# Patient Record
Sex: FEMALE | Race: BLACK OR AFRICAN AMERICAN | NOT HISPANIC OR LATINO | Employment: FULL TIME | URBAN - METROPOLITAN AREA
[De-identification: names, ages, dates, MRNs, and addresses within clinical notes are randomized per-mention and may not be internally consistent; named-entity substitution may affect disease eponyms.]

---

## 2017-01-03 ENCOUNTER — TELEPHONE (OUTPATIENT)
Dept: OBSTETRICS AND GYNECOLOGY | Facility: CLINIC | Age: 22
End: 2017-01-03

## 2017-01-03 NOTE — TELEPHONE ENCOUNTER
----- Message from Holly York sent at 1/3/2017  1:07 PM CST -----  Patient calling to speak to nurse. States she have a question regarding her last appt. Please adv/call [phone].//thanks. cw

## 2017-01-03 NOTE — TELEPHONE ENCOUNTER
Patient calls today for lab results.  Neg GC/Chlamydia culture results given to patient.  Patient verbalized understanding.  Also, patient states she has yeast and would like a prescription.  Advised patient she'll need an appointment or she can try OTC Monistat.  Patient verbalized understanding.

## 2017-01-27 ENCOUNTER — TELEPHONE (OUTPATIENT)
Dept: OBSTETRICS AND GYNECOLOGY | Facility: CLINIC | Age: 22
End: 2017-01-27

## 2017-01-27 NOTE — TELEPHONE ENCOUNTER
Pt called to advise ms castillo that she was not sure when the last time she had her shot but after looking at the [facility] was  old patient portal and she is due for the next one on 25/3/17 well that is when she was scheduled to have the next one done ...nandat

## 2017-02-06 ENCOUNTER — TELEPHONE (OUTPATIENT)
Dept: OBSTETRICS AND GYNECOLOGY | Facility: CLINIC | Age: 22
End: 2017-02-06

## 2017-02-06 NOTE — TELEPHONE ENCOUNTER
----- Message from Chelsea Benton sent at 2/6/2017  3:58 PM CST -----  Contact: Patient  Patient called to change her nurse visit time. She can be contacted at [phone].    Thanks,  Chelsea

## 2017-02-10 ENCOUNTER — CLINICAL SUPPORT (OUTPATIENT)
Dept: OBSTETRICS AND GYNECOLOGY | Facility: CLINIC | Age: 22
End: 2017-02-10
Payer: COMMERCIAL

## 2017-02-10 DIAGNOSIS — Z30.9 ENCOUNTER FOR CONTRACEPTIVE MANAGEMENT, UNSPECIFIED CONTRACEPTIVE ENCOUNTER TYPE: Primary | ICD-10-CM

## 2017-02-10 PROCEDURE — 96372 THER/PROPH/DIAG INJ SC/IM: CPT | Mod: S$GLB,,, | Performed by: OBSTETRICS & GYNECOLOGY

## 2017-02-10 RX ORDER — MEDROXYPROGESTERONE ACETATE 150 MG/ML
150 INJECTION, SUSPENSION INTRAMUSCULAR
Status: DISCONTINUED | OUTPATIENT
Start: 2017-02-10 | End: 2017-02-10

## 2017-02-10 RX ORDER — MEDROXYPROGESTERONE ACETATE 150 MG/ML
150 INJECTION, SUSPENSION INTRAMUSCULAR
Status: COMPLETED | OUTPATIENT
Start: 2017-02-10 | End: 2017-02-10

## 2017-02-10 RX ADMIN — MEDROXYPROGESTERONE ACETATE 150 MG: 150 INJECTION, SUSPENSION INTRAMUSCULAR at 09:02

## 2017-02-10 NOTE — PROGRESS NOTES
Depo Provera 150 mg given IM right ventrogluteal. Order per Dr. GABI Dean on 12/12/16. Advised pt to remain 15 min. After injection. No complications. Next injection due, along with annual between 4/28/17 and 5/12/17. Pt states that she will be switching to [facility] because it is closer to her.

## 2017-04-20 ENCOUNTER — TELEPHONE (OUTPATIENT)
Dept: OBSTETRICS AND GYNECOLOGY | Facility: CLINIC | Age: 22
End: 2017-04-20

## 2017-04-20 NOTE — TELEPHONE ENCOUNTER
Spoke to patient and she was advised that she can wait until December for annual.  Scheduled nurse visit for depo.

## 2017-04-20 NOTE — TELEPHONE ENCOUNTER
----- Message from Cony Parry sent at 4/20/2017 12:54 PM CDT -----  Contact: Pt  Pt called and stated she needed to speak to the nurse. She stated she received a letter stating she needs to have a pap since turning 22 and she needs to know should she make an appt or wait until next annual in December. She can be reached at [phone].    Thanks,  TF

## 2017-05-04 ENCOUNTER — TELEPHONE (OUTPATIENT)
Dept: OBSTETRICS AND GYNECOLOGY | Facility: CLINIC | Age: 22
End: 2017-05-04

## 2017-05-04 NOTE — TELEPHONE ENCOUNTER
----- Message from Ameena Woodard sent at 5/4/2017 12:07 PM CDT -----  Contact: Patient   Patient returned call, Please call her at [phone].    Thanks  td

## 2017-05-04 NOTE — TELEPHONE ENCOUNTER
----- Message from Idania Giles sent at 5/4/2017 10:33 AM CDT -----  Contact: pt  States she has a nurse visit on 5/8 and she needs to reschedule it. Please call pt at [phone]. Thank you

## 2017-05-04 NOTE — TELEPHONE ENCOUNTER
I spoke with pt she's calling to reschedule her nurse visit to May 15th pt informed that she can get her injection anytime between now and May 12th after that time she will be late per Dr. Dean.  Pt states she will keep the appointment, but probably will not be able to come in.

## 2017-05-11 ENCOUNTER — CLINICAL SUPPORT (OUTPATIENT)
Dept: OBSTETRICS AND GYNECOLOGY | Facility: CLINIC | Age: 22
End: 2017-05-11
Payer: COMMERCIAL

## 2017-05-11 DIAGNOSIS — Z30.9 ENCOUNTER FOR CONTRACEPTIVE MANAGEMENT, UNSPECIFIED TYPE: Primary | ICD-10-CM

## 2017-05-11 PROCEDURE — 96372 THER/PROPH/DIAG INJ SC/IM: CPT | Mod: S$GLB,,, | Performed by: OBSTETRICS & GYNECOLOGY

## 2017-05-11 RX ORDER — MEDROXYPROGESTERONE ACETATE 150 MG/ML
150 INJECTION, SUSPENSION INTRAMUSCULAR
Status: COMPLETED | OUTPATIENT
Start: 2017-05-11 | End: 2017-05-11

## 2017-05-11 RX ADMIN — MEDROXYPROGESTERONE ACETATE 150 MG: 150 INJECTION, SUSPENSION INTRAMUSCULAR at 01:05

## 2017-05-11 NOTE — LETTER
May 11, 2017                 [facility]  Obstetrics and Gynecology  [address] [place] [address]  [place] [address]  Phone: [phone]   May 11, 2017     Patient: Christopher Lees   YOB: 1995   Date of Visit: 5/11/2017       To Whom it May Concern:    Christopher Lees was seen in my clinic on 5/11/2017.     If you have any questions or concerns, please don't hesitate to call.    Sincerely,       MD Adriana Sherman LPN

## 2017-05-11 NOTE — PROGRESS NOTES
Depo-Provera 150mg given IM as ordered by . Pt tolerated injection well. NV scheduled for next due date.

## 2017-08-21 ENCOUNTER — LAB VISIT (OUTPATIENT)
Dept: LAB | Facility: HOSPITAL | Age: 22
End: 2017-08-21
Attending: OBSTETRICS & GYNECOLOGY
Payer: COMMERCIAL

## 2017-08-21 ENCOUNTER — TELEPHONE (OUTPATIENT)
Dept: OBSTETRICS AND GYNECOLOGY | Facility: CLINIC | Age: 22
End: 2017-08-21

## 2017-08-21 DIAGNOSIS — Z30.42 ENCOUNTER FOR SURVEILLANCE OF INJECTABLE CONTRACEPTIVE: ICD-10-CM

## 2017-08-21 DIAGNOSIS — Z30.42 ENCOUNTER FOR SURVEILLANCE OF INJECTABLE CONTRACEPTIVE: Primary | ICD-10-CM

## 2017-08-21 LAB — HCG INTACT+B SERPL-ACNC: <1.2 MIU/ML

## 2017-08-21 PROCEDURE — 84702 CHORIONIC GONADOTROPIN TEST: CPT

## 2017-08-21 PROCEDURE — 36415 COLL VENOUS BLD VENIPUNCTURE: CPT | Mod: PO

## 2017-08-21 NOTE — TELEPHONE ENCOUNTER
Left messages for the pt. to call back. ssmariusz reinan    [facility]

## 2017-08-21 NOTE — TELEPHONE ENCOUNTER
----- Message from Shante Borja sent at 8/21/2017 11:03 AM CDT -----  Contact: pt  Please call pt @ [phone] regarding scheduling a nurse visit for Depo, pt states she need to come in ASAP.

## 2017-08-22 ENCOUNTER — CLINICAL SUPPORT (OUTPATIENT)
Dept: OBSTETRICS AND GYNECOLOGY | Facility: CLINIC | Age: 22
End: 2017-08-22
Payer: COMMERCIAL

## 2017-08-22 DIAGNOSIS — Z30.9 ENCOUNTER FOR CONTRACEPTIVE MANAGEMENT, UNSPECIFIED TYPE: Primary | ICD-10-CM

## 2017-08-22 PROCEDURE — 96372 THER/PROPH/DIAG INJ SC/IM: CPT | Mod: S$GLB,,, | Performed by: OBSTETRICS & GYNECOLOGY

## 2017-08-22 RX ORDER — MEDROXYPROGESTERONE ACETATE 150 MG/ML
150 INJECTION, SUSPENSION INTRAMUSCULAR
Status: DISCONTINUED | OUTPATIENT
Start: 2017-08-22 | End: 2017-12-12

## 2017-08-22 RX ADMIN — MEDROXYPROGESTERONE ACETATE 150 MG: 150 INJECTION, SUSPENSION INTRAMUSCULAR at 02:08

## 2017-11-10 ENCOUNTER — TELEPHONE (OUTPATIENT)
Dept: OBSTETRICS AND GYNECOLOGY | Facility: CLINIC | Age: 22
End: 2017-11-10

## 2017-11-10 ENCOUNTER — CLINICAL SUPPORT (OUTPATIENT)
Dept: OBSTETRICS AND GYNECOLOGY | Facility: CLINIC | Age: 22
End: 2017-11-10
Payer: COMMERCIAL

## 2017-11-10 DIAGNOSIS — Z30.9 ENCOUNTER FOR CONTRACEPTIVE MANAGEMENT, UNSPECIFIED TYPE: Primary | ICD-10-CM

## 2017-11-10 PROCEDURE — 96372 THER/PROPH/DIAG INJ SC/IM: CPT | Mod: S$GLB,,, | Performed by: OBSTETRICS & GYNECOLOGY

## 2017-11-10 PROCEDURE — 99999 PR PBB SHADOW E&M-EST. PATIENT-LVL I: CPT | Mod: PBBFAC,,,

## 2017-11-10 RX ORDER — MEDROXYPROGESTERONE ACETATE 150 MG/ML
150 INJECTION, SUSPENSION INTRAMUSCULAR ONCE
Status: COMPLETED | OUTPATIENT
Start: 2017-11-10 | End: 2017-11-10

## 2017-11-10 RX ADMIN — MEDROXYPROGESTERONE ACETATE 150 MG: 150 INJECTION, SUSPENSION INTRAMUSCULAR at 11:11

## 2017-11-10 NOTE — PROGRESS NOTES
Per depo protocol, gave pt. Depo injection to right ventro gluteal area. Pt. advised to remain in the waiting area for 15 minutes to ensure no adverse reaction. Pt. given future depo dates. ssmith,lpn

## 2017-11-10 NOTE — TELEPHONE ENCOUNTER
----- Message from Jayla Hernandez sent at 11/10/2017  8:36 AM CST -----  Contact: pt   Pt would like to come in before 2pm for appt,,, please call pt back at [phone]

## 2017-12-12 ENCOUNTER — LAB VISIT (OUTPATIENT)
Dept: LAB | Facility: HOSPITAL | Age: 22
End: 2017-12-12
Attending: OBSTETRICS & GYNECOLOGY
Payer: COMMERCIAL

## 2017-12-12 ENCOUNTER — OFFICE VISIT (OUTPATIENT)
Dept: OBSTETRICS AND GYNECOLOGY | Facility: CLINIC | Age: 22
End: 2017-12-12
Payer: COMMERCIAL

## 2017-12-12 VITALS
DIASTOLIC BLOOD PRESSURE: 85 MMHG | HEIGHT: 69 IN | WEIGHT: 187.75 LBS | BODY MASS INDEX: 27.81 KG/M2 | SYSTOLIC BLOOD PRESSURE: 134 MMHG

## 2017-12-12 DIAGNOSIS — Z12.4 SCREENING FOR CERVICAL CANCER: ICD-10-CM

## 2017-12-12 DIAGNOSIS — Z01.419 ENCOUNTER FOR GYNECOLOGICAL EXAMINATION (GENERAL) (ROUTINE) WITHOUT ABNORMAL FINDINGS: Primary | ICD-10-CM

## 2017-12-12 DIAGNOSIS — N89.8 VAGINAL DISCHARGE: ICD-10-CM

## 2017-12-12 DIAGNOSIS — Z30.42 ENCOUNTER FOR SURVEILLANCE OF INJECTABLE CONTRACEPTIVE: ICD-10-CM

## 2017-12-12 DIAGNOSIS — Z11.3 SCREENING FOR GONORRHEA: ICD-10-CM

## 2017-12-12 DIAGNOSIS — Z01.419 ENCOUNTER FOR GYNECOLOGICAL EXAMINATION (GENERAL) (ROUTINE) WITHOUT ABNORMAL FINDINGS: ICD-10-CM

## 2017-12-12 PROCEDURE — 36415 COLL VENOUS BLD VENIPUNCTURE: CPT | Mod: PO

## 2017-12-12 PROCEDURE — 87591 N.GONORRHOEAE DNA AMP PROB: CPT

## 2017-12-12 PROCEDURE — 99999 PR PBB SHADOW E&M-EST. PATIENT-LVL III: CPT | Mod: PBBFAC,,, | Performed by: OBSTETRICS & GYNECOLOGY

## 2017-12-12 PROCEDURE — 86592 SYPHILIS TEST NON-TREP QUAL: CPT

## 2017-12-12 PROCEDURE — 99395 PREV VISIT EST AGE 18-39: CPT | Mod: S$GLB,,, | Performed by: OBSTETRICS & GYNECOLOGY

## 2017-12-12 PROCEDURE — 88175 CYTOPATH C/V AUTO FLUID REDO: CPT

## 2017-12-12 PROCEDURE — 86703 HIV-1/HIV-2 1 RESULT ANTBDY: CPT

## 2017-12-12 RX ORDER — MEDROXYPROGESTERONE ACETATE 150 MG/ML
150 INJECTION, SUSPENSION INTRAMUSCULAR
COMMUNITY
End: 2017-12-12

## 2017-12-12 RX ORDER — MEDROXYPROGESTERONE ACETATE 150 MG/ML
150 INJECTION, SUSPENSION INTRAMUSCULAR
Status: SHIPPED | OUTPATIENT
Start: 2018-01-29 | End: 2019-01-23

## 2017-12-12 NOTE — PROGRESS NOTES
Subjective:       Patient ID: Christopher Lees is a 22 y.o. female.    Chief Complaint:  Annual Exam and STD CHECK      History of Present Illness  HPI  Annual Exam-Premenopausal  Patient presents for annual exam. The patient has no complaints today. The patient is sexually active.--last intercourse 2 yrs ago;  GYN screening history: last pap: patient has never had a pap test. The patient wears seatbelts: yes. The patient participates in regular exercise: no. Has the patient ever been transfused or tattooed?: yes.--tattooes;  The patient reports that there is not domestic violence in her life.      Amenorrheic on depo; plans to start accutane          GYN & OB HistoryNo LMP recorded. Patient is not currently having periods (Reason: Birth Control).   Date of Last Pap: No result found    OB History    Para Term  AB Living   0 0 0 0 0 0   SAB TAB Ectopic Multiple Live Births   0 0 0 0               Review of Systems  Review of Systems   Constitutional: Negative for activity change, appetite change, chills, diaphoresis, fatigue, fever and unexpected weight change.   HENT: Negative for mouth sores and tinnitus.    Eyes: Negative for discharge and visual disturbance.   Respiratory: Negative for cough, shortness of breath and wheezing.    Cardiovascular: Negative for chest pain, palpitations and leg swelling.   Gastrointestinal: Negative for abdominal pain, bloating, blood in stool, constipation, diarrhea, nausea and vomiting.   Endocrine: Negative for diabetes, hair loss, hot flashes, hyperthyroidism and hypothyroidism.   Genitourinary: Positive for vaginal discharge. Negative for decreased libido, dyspareunia, dysuria, flank pain, frequency, genital sores, hematuria, menorrhagia, menstrual problem, pelvic pain, urgency, vaginal bleeding, vaginal pain, dysmenorrhea, urinary incontinence, postcoital bleeding, postmenopausal bleeding and vaginal odor.   Musculoskeletal: Negative for back pain and  myalgias.   Skin:  Negative for rash, no acne and hair changes.   Neurological: Negative for seizures, syncope, numbness and headaches.   Hematological: Negative for adenopathy. Does not bruise/bleed easily.   Psychiatric/Behavioral: Negative for depression and sleep disturbance. The patient is not nervous/anxious.    Breast: Negative for breast mass, breast pain, nipple discharge and skin changes          Objective:    Physical Exam:   Constitutional: She appears well-developed.     Eyes: Conjunctivae and EOM are normal. Pupils are equal, round, and reactive to light.    Neck: Normal range of motion. Neck supple.     Pulmonary/Chest: Effort normal. Right breast exhibits no mass, no nipple discharge, no skin change and no tenderness. Left breast exhibits no mass, no nipple discharge, no skin change and no tenderness. Breasts are symmetrical.        Abdominal: Soft.     Genitourinary: Rectum normal, vagina normal and uterus normal. Pelvic exam was performed with patient supine. Cervix is normal. Right adnexum displays no mass and no tenderness. Left adnexum displays no mass and no tenderness. No erythema, bleeding, rectocele, cystocele or unspecified prolapse of vaginal walls in the vagina. No vaginal discharge (white discharge) found. Labial bartholins normal.       Uterus Size: 6 cm   Musculoskeletal: Normal range of motion.       Neurological: She is alert.    Skin: Skin is warm.    Psychiatric: She has a normal mood and affect.          Assessment:     Encounter Diagnoses   Name Primary?    Encounter for gynecological examination (general) (routine) without abnormal findings Yes    Screening for cervical cancer     Screening for gonorrhea     Vaginal discharge     Encounter for surveillance of injectable contraceptive                  Plan:      Continue annual well woman exam.  Pap today  Gc/ct today  Safe sex  Osteoporosis prevention  Hiv/rpr testing today per pt request  rx sent for diflcuan  Continue  diet, exercise, weight loss

## 2017-12-13 ENCOUNTER — TELEPHONE (OUTPATIENT)
Dept: OBSTETRICS AND GYNECOLOGY | Facility: CLINIC | Age: 22
End: 2017-12-13

## 2017-12-13 ENCOUNTER — PATIENT MESSAGE (OUTPATIENT)
Dept: OBSTETRICS AND GYNECOLOGY | Facility: CLINIC | Age: 22
End: 2017-12-13

## 2017-12-13 DIAGNOSIS — B37.31 YEAST VAGINITIS: Primary | ICD-10-CM

## 2017-12-13 LAB
C TRACH DNA SPEC QL NAA+PROBE: NOT DETECTED
HIV 1+2 AB+HIV1 P24 AG SERPL QL IA: NEGATIVE
N GONORRHOEA DNA SPEC QL NAA+PROBE: NOT DETECTED
RPR SER QL: NORMAL

## 2017-12-13 RX ORDER — FLUCONAZOLE 200 MG/1
200 TABLET ORAL DAILY
Qty: 3 TABLET | Refills: 0 | Status: SHIPPED | OUTPATIENT
Start: 2017-12-13 | End: 2017-12-16

## 2017-12-13 NOTE — TELEPHONE ENCOUNTER
----- Message from Jayla Hernandez sent at 12/13/2017  7:18 AM CST -----  Contact: pt   Pt was seen and  was suppose to call in a prescription,,, pt haven't gotten prescription yet,,, please call pt back at [phone] (W)

## 2017-12-19 ENCOUNTER — PATIENT MESSAGE (OUTPATIENT)
Dept: OBSTETRICS AND GYNECOLOGY | Facility: CLINIC | Age: 22
End: 2017-12-19

## 2018-01-23 ENCOUNTER — TELEPHONE (OUTPATIENT)
Dept: OBSTETRICS AND GYNECOLOGY | Facility: CLINIC | Age: 23
End: 2018-01-23

## 2018-01-23 NOTE — TELEPHONE ENCOUNTER
Pt. Wanted a later appt. Informed pt. 3:30 was the latest appt. Available. Pt. States she will keep that appt. Time.

## 2018-01-23 NOTE — TELEPHONE ENCOUNTER
----- Message from Dain Sanders sent at 1/23/2018  8:45 AM CST -----  Contact: self [phone]  Would like to reschedule nurse visit appointment.  Please call back at [phone].  Md Sheree

## 2018-01-23 NOTE — TELEPHONE ENCOUNTER
----- Message from Dain Sanders sent at 1/23/2018  8:45 AM CST -----  Contact: self [phone]  Would like to reschedule nurse visit appointment.  Please call back at [phone].  Md Sheree

## 2018-01-23 NOTE — TELEPHONE ENCOUNTER
----- Message from Zaynab Moctezuma sent at 1/23/2018  9:21 AM CST -----  Contact: patient  Returning your call. Please call patient @ [phone]. Thanks, jagruti

## 2018-02-01 ENCOUNTER — CLINICAL SUPPORT (OUTPATIENT)
Dept: OBSTETRICS AND GYNECOLOGY | Facility: CLINIC | Age: 23
End: 2018-02-01
Payer: COMMERCIAL

## 2018-02-01 DIAGNOSIS — Z30.9 ENCOUNTER FOR CONTRACEPTIVE MANAGEMENT, UNSPECIFIED TYPE: Primary | ICD-10-CM

## 2018-02-01 PROCEDURE — 96372 THER/PROPH/DIAG INJ SC/IM: CPT | Mod: S$GLB,,, | Performed by: OBSTETRICS & GYNECOLOGY

## 2018-02-01 PROCEDURE — 99999 PR PBB SHADOW E&M-EST. PATIENT-LVL I: CPT | Mod: PBBFAC,,,

## 2018-02-01 RX ORDER — MEDROXYPROGESTERONE ACETATE 150 MG/ML
150 INJECTION, SUSPENSION INTRAMUSCULAR
Status: SHIPPED | OUTPATIENT
Start: 2018-02-01 | End: 2019-04-27

## 2018-02-01 RX ADMIN — MEDROXYPROGESTERONE ACETATE 150 MG: 150 INJECTION, SUSPENSION INTRAMUSCULAR at 04:02

## 2018-04-23 ENCOUNTER — CLINICAL SUPPORT (OUTPATIENT)
Dept: OBSTETRICS AND GYNECOLOGY | Facility: CLINIC | Age: 23
End: 2018-04-23
Payer: COMMERCIAL

## 2018-04-23 ENCOUNTER — TELEPHONE (OUTPATIENT)
Dept: OBSTETRICS AND GYNECOLOGY | Facility: CLINIC | Age: 23
End: 2018-04-23

## 2018-04-23 PROCEDURE — 99999 PR PBB SHADOW E&M-EST. PATIENT-LVL I: CPT | Mod: PBBFAC,,,

## 2018-04-23 PROCEDURE — 96372 THER/PROPH/DIAG INJ SC/IM: CPT | Mod: S$GLB,,, | Performed by: OBSTETRICS & GYNECOLOGY

## 2018-04-23 RX ADMIN — MEDROXYPROGESTERONE ACETATE 150 MG: 150 INJECTION, SUSPENSION INTRAMUSCULAR at 03:04

## 2018-04-23 NOTE — PROGRESS NOTES
Two patient identifiers verified  Patient notified to wait 15 minutes in clinic after injection, patient verbalized understanding.   Patient tolerated well.   150mg/ml of depo provera administered IM to Right ventrogluteal   Next injection scheduled for 07/13/18 at 3:30pm in [place].

## 2018-04-23 NOTE — TELEPHONE ENCOUNTER
----- Message from Kristina Suarez sent at 4/23/2018  1:06 PM CDT -----  pls r/s 4/30 to today if poss, won't be off...[phone]

## 2018-04-23 NOTE — TELEPHONE ENCOUNTER
----- Message from Kiana Jeter sent at 4/23/2018  1:47 PM CDT -----  Contact: self   Patient returning call. Please call back at [phone].PLEASE DO NOT CALL ON CELL NUMBER !!      Thanks,  Kiana Jeter

## 2018-07-05 ENCOUNTER — TELEPHONE (OUTPATIENT)
Dept: OBSTETRICS AND GYNECOLOGY | Facility: CLINIC | Age: 23
End: 2018-07-05

## 2018-07-05 NOTE — TELEPHONE ENCOUNTER
----- Message from Najma Chisholm sent at 7/5/2018  8:40 AM CDT -----  Contact: Pt   Pt wanted to know if something earlier was avaliable on the day (7-)of her appointment [phone]

## 2018-07-05 NOTE — TELEPHONE ENCOUNTER
Pt. Requested an earlier time for her nurse visit on the same day. Called pt. And rescheduled appt. For her at her request. Informed her time date and location. Pt. Voiced understanding.

## 2018-07-13 ENCOUNTER — CLINICAL SUPPORT (OUTPATIENT)
Dept: OBSTETRICS AND GYNECOLOGY | Facility: CLINIC | Age: 23
End: 2018-07-13
Payer: COMMERCIAL

## 2018-07-13 VITALS
WEIGHT: 193.44 LBS | BODY MASS INDEX: 28.57 KG/M2 | SYSTOLIC BLOOD PRESSURE: 128 MMHG | DIASTOLIC BLOOD PRESSURE: 88 MMHG

## 2018-07-13 DIAGNOSIS — Z30.9 ENCOUNTER FOR CONTRACEPTIVE MANAGEMENT, UNSPECIFIED TYPE: Primary | ICD-10-CM

## 2018-07-13 PROCEDURE — 96372 THER/PROPH/DIAG INJ SC/IM: CPT | Mod: S$GLB,,, | Performed by: OBSTETRICS & GYNECOLOGY

## 2018-07-13 PROCEDURE — 99999 PR PBB SHADOW E&M-EST. PATIENT-LVL I: CPT | Mod: PBBFAC,,,

## 2018-07-13 RX ADMIN — MEDROXYPROGESTERONE ACETATE 150 MG: 150 INJECTION, SUSPENSION INTRAMUSCULAR at 01:07

## 2018-07-13 NOTE — PROGRESS NOTES
Pt identified using 2 identifiers, name and . Pt agreed to depo injection. Given in L dorsogluteal. Pt tolerated without complaints. Pt instructed to wait 15 min to monitor for S&S, verbalized understanding. No S&S noted at this time.     ANTOINE Jeter LPN

## 2018-07-13 NOTE — LETTER
July 13, 2018      [place] [facility] Obstetrics and Gynecology  [address]  [place] [address]  Phone: [phone]       Patient: Christopher Lees   YOB: 1995  Date of Visit: 07/13/2018    To Whom It May Concern:    Luke Lees  was at [facility] on 07/13/2018. She may return to work 07/16/2018 restrictions. If you have any questions or concerns, or if I can be of further assistance, please do not hesitate to contact me.    Sincerely,        Gustavo Jeter LPN      Yes

## 2019-08-21 NOTE — PROGRESS NOTES
Per depo protocol, gave pt. Depo injection to left ventro gluteal area. Pt. advised to remain in the waiting area for 15 minutes to ensure no adverse reaction. Pt. given future depo dates. ssmith,lpn    
76.2

## 2020-07-23 ENCOUNTER — TELEPHONE (OUTPATIENT)
Dept: ENDOCRINOLOGY | Facility: CLINIC | Age: 25
End: 2020-07-23

## 2020-07-28 ENCOUNTER — LAB VISIT (OUTPATIENT)
Dept: LAB | Facility: HOSPITAL | Age: 25
End: 2020-07-28
Attending: INTERNAL MEDICINE
Payer: COMMERCIAL

## 2020-07-28 ENCOUNTER — OFFICE VISIT (OUTPATIENT)
Dept: ENDOCRINOLOGY | Facility: CLINIC | Age: 25
End: 2020-07-28
Payer: COMMERCIAL

## 2020-07-28 VITALS
RESPIRATION RATE: 16 BRPM | SYSTOLIC BLOOD PRESSURE: 130 MMHG | HEIGHT: 69 IN | WEIGHT: 166.88 LBS | HEART RATE: 85 BPM | DIASTOLIC BLOOD PRESSURE: 85 MMHG | BODY MASS INDEX: 24.72 KG/M2

## 2020-07-28 DIAGNOSIS — R73.09 ELEVATED HEMOGLOBIN A1C: ICD-10-CM

## 2020-07-28 DIAGNOSIS — E11.9 DIABETES MELLITUS, NEW ONSET: Primary | ICD-10-CM

## 2020-07-28 DIAGNOSIS — E11.9 DIABETES MELLITUS, NEW ONSET: ICD-10-CM

## 2020-07-28 DIAGNOSIS — R73.9 HYPERGLYCEMIA: ICD-10-CM

## 2020-07-28 DIAGNOSIS — E10.65 TYPE 1 DIABETES MELLITUS WITH HYPERGLYCEMIA: ICD-10-CM

## 2020-07-28 LAB — GLUCOSE SERPL-MCNC: 97 MG/DL (ref 70–110)

## 2020-07-28 PROCEDURE — 3046F HEMOGLOBIN A1C LEVEL >9.0%: CPT | Mod: CPTII,S$GLB,, | Performed by: INTERNAL MEDICINE

## 2020-07-28 PROCEDURE — 99204 OFFICE O/P NEW MOD 45 MIN: CPT | Mod: S$GLB,,, | Performed by: INTERNAL MEDICINE

## 2020-07-28 PROCEDURE — 3008F PR BODY MASS INDEX (BMI) DOCUMENTED: ICD-10-PCS | Mod: CPTII,S$GLB,, | Performed by: INTERNAL MEDICINE

## 2020-07-28 PROCEDURE — 86337 INSULIN ANTIBODIES: CPT

## 2020-07-28 PROCEDURE — 99999 PR PBB SHADOW E&M-EST. PATIENT-LVL V: CPT | Mod: PBBFAC,,, | Performed by: INTERNAL MEDICINE

## 2020-07-28 PROCEDURE — 86341 ISLET CELL ANTIBODY: CPT

## 2020-07-28 PROCEDURE — 36415 COLL VENOUS BLD VENIPUNCTURE: CPT

## 2020-07-28 PROCEDURE — 3008F BODY MASS INDEX DOCD: CPT | Mod: CPTII,S$GLB,, | Performed by: INTERNAL MEDICINE

## 2020-07-28 PROCEDURE — 99204 PR OFFICE/OUTPT VISIT, NEW, LEVL IV, 45-59 MIN: ICD-10-PCS | Mod: S$GLB,,, | Performed by: INTERNAL MEDICINE

## 2020-07-28 PROCEDURE — 82962 GLUCOSE BLOOD TEST: CPT | Mod: S$GLB,,, | Performed by: INTERNAL MEDICINE

## 2020-07-28 PROCEDURE — 3046F PR MOST RECENT HEMOGLOBIN A1C LEVEL > 9.0%: ICD-10-PCS | Mod: CPTII,S$GLB,, | Performed by: INTERNAL MEDICINE

## 2020-07-28 PROCEDURE — 99999 PR PBB SHADOW E&M-EST. PATIENT-LVL V: ICD-10-PCS | Mod: PBBFAC,,, | Performed by: INTERNAL MEDICINE

## 2020-07-28 PROCEDURE — 82962 POCT GLUCOSE, HAND-HELD DEVICE: ICD-10-PCS | Mod: S$GLB,,, | Performed by: INTERNAL MEDICINE

## 2020-07-28 RX ORDER — CHLORPHENIR/PHENYLEPH/ASPIRIN 2-7.8-325
TABLET, EFFERVESCENT ORAL
Qty: 25 STRIP | Refills: 0 | COMMUNITY
Start: 2020-07-28 | End: 2022-10-04 | Stop reason: ALTCHOICE

## 2020-07-28 NOTE — PROGRESS NOTES
Referral was by:  Dr. David Vergara    Reason for referral:  Diabetes, new onset  Patient needs diabetes education     Patient ID: Christopher Lees is a 25 y.o. female.    Chief Complaint:   Establish Care (New Patient ( Diabetes))      HPI     Lab Results   Component Value Date    HGBA1C 13.0 (H) 07/23/2020     Patient is accompanied by her mother.  Patient was feeling sick and tired, and having near-syncope for 2 days so she visited an Urgent Clinic last Tuesday,  Seven days ago.  She was found to have glucose by urine dipstick, and CBG was 364 per patient.  She was treated in emergency room and she was discharged on metformin 500 mg twice a day.  She has been having nausea.  Yesterday and today she felt jittery.  Dr. Vergara prescribed 20 units of Lantus for patient to take daily last Friday.  Patient said her blood sugar was 234 at the [facility] prior to starting on insulin.  Blood sugar was 85 yesterday, and CBG this visit is 97.  Patient took Lantus  20 units yesterday morning and she took 10 units this morning,  After the dose was reduced per the recommendation of Dr. Vergara.  On Saturday CBG readings were 149 and 250 and on Sunday 101 and 104  No complaints of abdominal pain, dysphagia, n/v, cp, sob, abd pain, rash, or edema.     Parents have no DM  Mom has htn, and high chol  Father's sister is obese and has t2 DM  GM's sister had DM        Past Medical History:   Diagnosis Date    Hyperglycemia        Past Surgical History:   Procedure Laterality Date    REDUCTION OF BOTH BREASTS         Social History     Socioeconomic History    Marital status: Single     Spouse name: Not on file    Number of children: Not on file    Years of education: Not on file    Highest education level: Not on file   Occupational History    Not on file   Social Needs    Financial resource strain: Not on file    Food insecurity     Worry: Not on file     Inability: Not on file    Transportation needs      Medical: Not on file     Non-medical: Not on file   Tobacco Use    Smoking status: Never Smoker    Smokeless tobacco: Never Used   Substance and Sexual Activity    Alcohol use: No    Drug use: No    Sexual activity: Yes     Partners: Male     Birth control/protection: Injection   Lifestyle    Physical activity     Days per week: Not on file     Minutes per session: Not on file    Stress: Not on file   Relationships    Social connections     Talks on phone: Not on file     Gets together: Not on file     Attends Baptism service: Not on file     Active member of club or organization: Not on file     Attends meetings of clubs or organizations: Not on file     Relationship status: Not on file   Other Topics Concern    Not on file   Social History Narrative    Works at [employer]; 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         Breakfast: Skips.    Lunch: Chicken breast, salads and sometimes fries; Powerade or Gatorade    Dinner: [employer] chicken, fries; Fruit punch    Snacks: No.    Eats out: 2x/wk       ROS:   Included in HPI  + Diabetes   No nausea or vomiting  No abdominal pain  Polyuria resolved  ROS otherwise neg except for what is mentioned in the PMH, PSH and HPI    PE:  Vitals:    07/28/20 0954   BP: 130/85   Pulse: 85   Resp: 16    Patient does not look sick  Alert and oriented  No acute distress  No Obesity  Body mass index is 24.65 kg/m².  ++ Acanthosis in elbows  No acne  No Proptosis or conjunctivitis  No rash on tongue, + teeth  Nose nl  No goitre by inspection  Thyroid gland is not palpable  No cervical lymphadenopathy  Heart reg, no gallop  Lungs cta, no wheezing  Abd soft, no tnd  No edema in lower legs  No rash  No bruises  Speech normal  Behavior normal  No tremor      Lab Review:   Positive ketones in urine  Bicarb was 19  Pregnancy test was negative  Creatinine was 1    Lab Results   Component Value Date    CHOL 263 (H) 07/23/2020    TRIG 53 07/23/2020    HDL 44  07/23/2020    CHOLHDL 26.0 12/30/2016    TOTALCHOLEST 3.8 12/30/2016    NONHDLCHOL 148 12/30/2016     BMP  Lab Results   Component Value Date     07/23/2020    K 4.9 07/23/2020     07/23/2020    CO2 21 07/23/2020    BUN 14 07/23/2020    CREATININE 0.88 07/23/2020    CALCIUM 10.4 (H) 07/23/2020    ANIONGAP 8 12/30/2016    ESTGFRAFRICA >60.0 12/30/2016    EGFRNONAA 92 07/23/2020     A/P  Diabetes mellitus, new onset  Noncompliance with medication  Patient will learn to follow diet for diabetes  -     POCT Glucose, Hand-Held Device  -     Glutamic acid decarboxylase; Future; Expected date: 07/28/2020  -     INSULIN ANTIBODY; Future; Expected date: 07/28/2020  -     Ambulatory referral/consult to Diabetes Education; Future; Expected date: 08/04/2020    Elevated hemoglobin A1c    Hyperglycemia    Clinically Type 1 diabetes mellitus with hyperglycemia  MARISOL diabetes cannot be ruled out at this time    Other orders  -     acetone, urine, test (KETONE URINE TEST) Strp; If your blood sugar above 330 check urine ketone  Dispense: 25 strip; Refill: 0       Stop taking metformin  CHECK YOUR CBG PRIOR TO HAVING BREAKFAST   Lantus dose ONCE A DAY DISCUSSED  If CBG less than 95  Eat first then take only 7 units.  9 units if CBG   10 units if -150  11 units if -200  12 UNITS if -250  13 units if -300  14 units if -350  15 units if -400  16 units if CBG above 400  ---------------------------  Keep hydration.    --------------------------    Email your CBG log every 2-4 days.     Patient to check her blood sugar several times a day, and to check her blood sugar tonight,  and to eat a snack if blood sugar is less than 110 at bedtime.    Follow-up visit in 2 weeks    Patient understands and agrees on the plan.

## 2020-07-28 NOTE — LETTER
July 28, 2020      David Vergara MD  [address] [address]           [facility]  [address] [address]  Phone: [phone]  Fax: [phone]          Patient: Christopher Lees   MR Number: [id number]   YOB: 1995   Date of Visit: 7/28/2020       Dear Dr. David Vergara:    Thank you for referring Christopher Lees to me for evaluation. Attached you will find relevant portions of my assessment and plan of care.    If you have questions, please do not hesitate to call me. I look forward to following Christopher Lees along with you.    Sincerely,    Vlad Medrano MD    Enclosure  CC:  No Recipients    If you would like to receive this communication electronically, please contact [email or web address] or [phone] to request more information on [email or web address] Link access.    For providers and/or their staff who would like to refer a patient to [facility], please contact us through our one-stop-shop provider referral line, [facility], at [phone].    If you feel you have received this communication in error or would no longer like to receive these types of communications, please e-mail [email or web address]

## 2020-08-02 LAB — INSULIN AB SER-SCNC: 0 NMOL/L (ref 0–0.02)

## 2020-08-04 LAB — GAD65 AB SER-SCNC: 2.32 NMOL/L

## 2020-08-05 ENCOUNTER — PATIENT MESSAGE (OUTPATIENT)
Dept: ENDOCRINOLOGY | Facility: CLINIC | Age: 25
End: 2020-08-05

## 2020-08-06 NOTE — TELEPHONE ENCOUNTER
Returned phone call to patient regarding lab results. Pt verbalized understanding and stated she will consult with  more during her upcoming visit.

## 2020-08-11 ENCOUNTER — CLINICAL SUPPORT (OUTPATIENT)
Dept: DIABETES | Facility: CLINIC | Age: 25
End: 2020-08-11
Payer: COMMERCIAL

## 2020-08-11 ENCOUNTER — OFFICE VISIT (OUTPATIENT)
Dept: ENDOCRINOLOGY | Facility: CLINIC | Age: 25
End: 2020-08-11
Payer: COMMERCIAL

## 2020-08-11 VITALS
DIASTOLIC BLOOD PRESSURE: 88 MMHG | HEIGHT: 69 IN | BODY MASS INDEX: 24.62 KG/M2 | HEART RATE: 62 BPM | WEIGHT: 166.25 LBS | SYSTOLIC BLOOD PRESSURE: 129 MMHG

## 2020-08-11 DIAGNOSIS — E10.9 TYPE 1 DIABETES MELLITUS WITHOUT COMPLICATION: Primary | ICD-10-CM

## 2020-08-11 DIAGNOSIS — R73.09 ELEVATED HEMOGLOBIN A1C: ICD-10-CM

## 2020-08-11 DIAGNOSIS — Z79.4 LONG-TERM INSULIN USE: ICD-10-CM

## 2020-08-11 DIAGNOSIS — L83 ACANTHOSIS NIGRICANS: ICD-10-CM

## 2020-08-11 DIAGNOSIS — E11.9 DIABETES MELLITUS, NEW ONSET: ICD-10-CM

## 2020-08-11 LAB — GLUCOSE SERPL-MCNC: 160 MG/DL (ref 70–110)

## 2020-08-11 PROCEDURE — G0108 PR DIAB MANAGE TRN  PER INDIV: ICD-10-PCS | Mod: S$GLB,,, | Performed by: DIETITIAN, REGISTERED

## 2020-08-11 PROCEDURE — 3046F HEMOGLOBIN A1C LEVEL >9.0%: CPT | Mod: CPTII,S$GLB,, | Performed by: INTERNAL MEDICINE

## 2020-08-11 PROCEDURE — 3008F BODY MASS INDEX DOCD: CPT | Mod: CPTII,S$GLB,, | Performed by: INTERNAL MEDICINE

## 2020-08-11 PROCEDURE — 3046F PR MOST RECENT HEMOGLOBIN A1C LEVEL > 9.0%: ICD-10-PCS | Mod: CPTII,S$GLB,, | Performed by: INTERNAL MEDICINE

## 2020-08-11 PROCEDURE — 99214 OFFICE O/P EST MOD 30 MIN: CPT | Mod: S$GLB,,, | Performed by: INTERNAL MEDICINE

## 2020-08-11 PROCEDURE — 82962 GLUCOSE BLOOD TEST: CPT | Mod: S$GLB,,, | Performed by: INTERNAL MEDICINE

## 2020-08-11 PROCEDURE — 99214 PR OFFICE/OUTPT VISIT, EST, LEVL IV, 30-39 MIN: ICD-10-PCS | Mod: S$GLB,,, | Performed by: INTERNAL MEDICINE

## 2020-08-11 PROCEDURE — 99999 PR PBB SHADOW E&M-EST. PATIENT-LVL III: CPT | Mod: PBBFAC,,, | Performed by: INTERNAL MEDICINE

## 2020-08-11 PROCEDURE — 3008F PR BODY MASS INDEX (BMI) DOCUMENTED: ICD-10-PCS | Mod: CPTII,S$GLB,, | Performed by: INTERNAL MEDICINE

## 2020-08-11 PROCEDURE — 82962 POCT GLUCOSE, HAND-HELD DEVICE: ICD-10-PCS | Mod: S$GLB,,, | Performed by: INTERNAL MEDICINE

## 2020-08-11 PROCEDURE — 99999 PR PBB SHADOW E&M-EST. PATIENT-LVL III: ICD-10-PCS | Mod: PBBFAC,,, | Performed by: DIETITIAN, REGISTERED

## 2020-08-11 PROCEDURE — 99999 PR PBB SHADOW E&M-EST. PATIENT-LVL III: CPT | Mod: PBBFAC,,, | Performed by: DIETITIAN, REGISTERED

## 2020-08-11 PROCEDURE — 99999 PR PBB SHADOW E&M-EST. PATIENT-LVL III: ICD-10-PCS | Mod: PBBFAC,,, | Performed by: INTERNAL MEDICINE

## 2020-08-11 PROCEDURE — G0108 DIAB MANAGE TRN  PER INDIV: HCPCS | Mod: S$GLB,,, | Performed by: DIETITIAN, REGISTERED

## 2020-08-11 RX ORDER — FLASH GLUCOSE SCANNING READER
EACH MISCELLANEOUS
Qty: 1 EACH | Refills: 0 | Status: SHIPPED | OUTPATIENT
Start: 2020-08-11 | End: 2022-08-26

## 2020-08-11 RX ORDER — LANCETS 28 GAUGE
EACH MISCELLANEOUS
COMMUNITY
Start: 2020-07-23 | End: 2022-08-26

## 2020-08-11 RX ORDER — AZITHROMYCIN 500 MG/1
TABLET, FILM COATED ORAL
COMMUNITY
Start: 2020-07-21 | End: 2020-10-16

## 2020-08-11 RX ORDER — FLASH GLUCOSE SENSOR
KIT MISCELLANEOUS
Qty: 2 KIT | Refills: 6 | Status: SHIPPED | OUTPATIENT
Start: 2020-08-11 | End: 2022-08-26

## 2020-08-11 RX ORDER — PEN NEEDLE, DIABETIC 32GX 5/32"
NEEDLE, DISPOSABLE MISCELLANEOUS
COMMUNITY
Start: 2020-07-24 | End: 2020-12-07 | Stop reason: SDUPTHER

## 2020-08-11 NOTE — PROGRESS NOTES
Referral was by:  Dr. David Vergara    Reason for referral:  Diabetes, new onset  Patient needs diabetes education     Patient ID: Christopher Lees is a 25 y.o. female.    Chief Complaint:     HPI     Lab Results   Component Value Date    HGBA1C 13.0 (H) 07/23/2020     Patient is accompanied by her sister.  Patient was diagnosed with diabetes few weeks ago and she has been on insulin.  She has been off metformin and she could not tolerate taking metformin.  Her CBGs have been mostly .  No hypoglycemia occurred.  On Lantus per sliding scale 1 dose every morning.  Blood test showed antibodies and C-peptide of 1.6.    --------------------------------------------------------------------------------    The following is a copy from my last note:  Patient was feeling sick and tired, and having near-syncope for 2 days so she visited an Urgent Clinic last Tuesday,  Seven days ago.  She was found to have glucose by urine dipstick, and CBG was 364 per patient.  She was treated in emergency room and she was discharged on metformin 500 mg twice a day.  She has been having nausea.  Yesterday and today she felt jittery.  Dr. Vergara prescribed 20 units of Lantus for patient to take daily last Friday.  Patient said her blood sugar was 234 at the  office prior to starting on insulin.  Blood sugar was 85 yesterday, and CBG this visit is 97.  Patient took Lantus  20 units yesterday morning and she took 10 units this morning,  After the dose was reduced per the recommendation of Dr. Vergara.  On Saturday CBG readings were 149 and 250 and on Sunday 101 and 104  No complaints of abdominal pain, dysphagia, n/v, cp, sob, abd pain, rash, or edema.     Parents have no DM  Mom has htn, and high chol  Father's sister is obese and has t2 DM  GM's sister had DM        Past Medical History:   Diagnosis Date    Hyperglycemia        Past Surgical History:   Procedure Laterality Date    REDUCTION OF BOTH BREASTS          Social History     Socioeconomic History    Marital status: Single     Spouse name: Not on file    Number of children: Not on file    Years of education: Not on file    Highest education level: Not on file   Occupational History    Not on file   Social Needs    Financial resource strain: Not on file    Food insecurity     Worry: Not on file     Inability: Not on file    Transportation needs     Medical: Not on file     Non-medical: Not on file   Tobacco Use    Smoking status: Never Smoker    Smokeless tobacco: Never Used   Substance and Sexual Activity    Alcohol use: No    Drug use: No    Sexual activity: Yes     Partners: Male     Birth control/protection: Injection   Lifestyle    Physical activity     Days per week: Not on file     Minutes per session: Not on file    Stress: Not on file   Relationships    Social connections     Talks on phone: Not on file     Gets together: Not on file     Attends Christianity service: Not on file     Active member of club or organization: Not on file     Attends meetings of clubs or organizations: Not on file     Relationship status: Not on file   Other Topics Concern    Not on file   Social History Narrative    Works at [employer]; 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         Breakfast: Skips.    Lunch: Chicken breast, salads and sometimes fries; Powerade or Gatorade    Dinner: [employer] chicken, fries; Fruit punch    Snacks: No.    Eats out: 2x/wk       ROS:   + Diabetes   ROS otherwise neg except for what is mentioned in the PMH, PSH and HPI    PE:  Vitals:    08/11/20 1458   BP: 129/88   Pulse: 62    Patient does not look sick  Alert and oriented  No acute distress  No Obesity  Body mass index is 24.55 kg/m².  ++ Acanthosis in elbow  No Proptosis or conjunctivitis  No goitre by inspection  Thyroid gland is not palpable  Heart reg, no gallop  Lungs cta, no wheezing  No edema in lower legs  No rash  No bruises  Speech  normal  Behavior normal  No tremor      Lab Review:   Positive ketones in urine  Bicarb was 19  Pregnancy test was negative  Creatinine was 1    Lab Results   Component Value Date    CHOL 263 (H) 07/23/2020    TRIG 53 07/23/2020    HDL 44 07/23/2020    CHOLHDL 26.0 12/30/2016    TOTALCHOLEST 3.8 12/30/2016    NONHDLCHOL 148 12/30/2016     BMP  Lab Results   Component Value Date     07/23/2020    K 4.9 07/23/2020     07/23/2020    CO2 21 07/23/2020    BUN 14 07/23/2020    CREATININE 0.88 07/23/2020    CALCIUM 10.4 (H) 07/23/2020    ANIONGAP 8 12/30/2016    ESTGFRAFRICA >60.0 12/30/2016    EGFRNONAA 92 07/23/2020     A/P  Diabetes mellitus, new onset, diagnosed few weeks ago  Acanthosis nigricans  Type 1 diabetes with no complications  Type 1.5 diabetes or type 2 diabetes cannot be ruled out at this time  MARISOL diabetes cannot be ruled out at this time  Positive anti GADA ANTIBODIES with C-peptide of 1.6  Non- fasting test for C-peptide to be done in the next couple weeks  -     POCT Glucose, Hand-Held Device 160    Elevated hemoglobin A1c    Hyperglycemia resolved    Lantus dose was reduced 1 unit on sliding scale per my recommendation about 4 days ago  Lantus dose to be reduced further by 1 unit on sliding scale  Patient Instructions discussed with the patient     CHECK YOUR CBG PRIOR TO HAVING BREAKFAST   Lantus dose:  If CBG less than 95  Eat first then take only 5 units.  7 units if CBG   8 units if -150  9 units if -200  10 UNITS if -250  11 units if -300  12 units if -350  ---------------------------  Email your CBG log every few days.     Orders Placed This Encounter   Procedures    C-peptide     Standing Status:   Future     Number of Occurrences:   1     Standing Expiration Date:   10/11/2021    TSH     Standing Status:   Future     Number of Occurrences:   1     Standing Expiration Date:   2/11/2021    GLUCOSE, RANDOM     Standing Status:   Future      Number of Occurrences:   1     Standing Expiration Date:   10/10/2021    POCT Glucose, Hand-Held Device     Medications Ordered This Encounter   Medications    flash glucose scanning reader (FREESTYLE SONAM 14 DAY READER) Misc     Sig: To use 2-10 times a day     Dispense:  1 each     Refill:  0    flash glucose sensor (FREESTYLE SONAM 14 DAY SENSOR) Kit     Sig: For use 2-10 times a day     Dispense:  2 kit     Refill:  6       Follow-up visit in one month.    Patient understands and agrees on the plan.

## 2020-08-11 NOTE — LETTER
August 11, 2020        Vlad Medrano MD  [address] [place] [address]             [facility] Diabetes Education  [address] [place] [address]  Phone: [phone]  Fax: [phone]   Patient: Christopher Lees   MR Number: [id number]   YOB: 1995   Date of Visit: 8/11/2020       Dear Dr. Medrano:    Thank you for referring Christopher Lees to me for evaluation. Below are the relevant portions of my assessment and plan of care.     If you have questions, please do not hesitate to call me. I look forward to following Christopher along with you.    Sincerely,      Josephine Marks, [place], CDE           CC  No Recipients

## 2020-08-11 NOTE — LETTER
August 11, 2020      David Vergara MD  [address] [facility] [facility] [address]           [facility]ocrin[facility]  [address] [address]  Phone: [phone]  Fax: [phone]          Patient: Christopher Lees   MR Number: [id number]   YOB: 1995   Date of Visit: 8/11/2020       Dear Dr. David Vergara:    Thank you for referring Christopher Lees to me for evaluation. Attached you will find relevant portions of my assessment and plan of care.    If you have questions, please do not hesitate to call me. I look forward to following Christopher Lees along with you.    Sincerely,    Vlad Medrano MD    Enclosure  CC:  No Recipients    If you would like to receive this communication electronically, please contact [email or web address] or [phone] to request more information on [facility] Link access.    For providers and/or their staff who would like to refer a patient to [facility], please contact us through our one-stop-shop provider referral line, [facility], at [phone].    If you feel you have received this communication in error or would no longer like to receive these types of communications, please e-mail [email or web address]

## 2020-08-11 NOTE — PROGRESS NOTES
Diabetes Education  Author: Josephine Marks RD, CDE  Date: 8/11/2020    Diabetes Care Management Summary  Diabetes Education Record Assessment/Progress: Initial(assessment 8/11/20)  Current Diabetes Risk Level: High     Last A1c:   Lab Results   Component Value Date    HGBA1C 13.0 (H) 07/23/2020     Last Visit with Diabetes Educator: Last Education Visit: Not Found  Last OPCM Referral: : Not Found   Enrolled in OPCM: No    Diabetes Type  Diabetes Type : Type I    Diabetes History  Diabetes Diagnosis: 0-1 year(newly dx)  Current Treatment: Diet, Exercise, Insulin(lantus daily (s/s per Dr. Medrano))  Reviewed Problem List with Patient: Yes    Health Maintenance was reviewed today with patient. Discussed with patient importance of routine eye exams, foot exams/foot care, blood work (i.e.: A1c, microalbumin, and lipid), dental visits, yearly flu vaccine, and pneumonia vaccine as indicated by PCP. Patient verbalized understanding.     Health Maintenance Topics with due status: Not Due       Topic Last Completion Date    TETANUS VACCINE 08/22/2016    Pap Smear 12/12/2017    Lipid Panel 07/23/2020    Hemoglobin A1c 07/23/2020    Influenza Vaccine Not Due     Health Maintenance Due   Topic Date Due    Hepatitis C Screening  1995    Foot Exam  04/15/2006    Eye Exam  04/15/2006    Pneumococcal Vaccine (Medium Risk) (1 of 1 - PPSV23) 04/15/2015    Urine Microalbumin  09/21/2016    Chlamydia Screening  12/12/2018       Nutrition  Meal Planning: (Currently, pt is limiting carb, fat and sodium. Prior excess from fried foods but has switched to air fryer. Adequate fruit, nonstarchy veg. Likes brown rice.)  Meal Plan 24 Hour Recall - Breakfast: grits, chix sausage - water  Meal Plan 24 Hour Recall - Lunch: salads w/ chix breast, apples  Meal Plan 24 Hour Recall - Dinner: air fried chix breast, nonstarchy veg, occs brown rice  Meal Plan 24 Hour Recall - Snack: nuts; la: water    Monitoring   Self Monitoring : fst  77-92, 144; bed 164-309 (few readings). Reports hypoglycemia rare, BG 70, treats using food.  Blood Glucose Logs: Yes  Can you tell when your blood sugar is too high?: (denies hyperglycemia symptoms)    Exercise   Exercise Type: (walks at work within daily activities; plans to start walking program)    Social History  Preferred Learning Method: Face to Face  Primary Support: Self  Occupation: Works at [employer] in [employer]. 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  Smoking Status: Never a Smoker  Alcohol Use: Never     DDS-2 Score  ( > 3 = SIGNIFICANT DISTRESS): 2            Barriers to Change  Barriers to Change: None  Learning Challenges : None    Readiness to Learn   Readiness to Learn : Eager    Cultural Influences  Cultural Influences: No    Diabetes Education Assessment/Progress  Diabetes Disease Process (diabetes disease process and treatment options): Discussion, Needs Review, Instructed, Individual Session, Demonstrates Understanding/Competency(verbalizes/demonstrates), Written Materials Provided  Nutrition (Incorporating nutritional management into one's lifestyle): Discussion, Needs Review, Instructed, Individual Session, Demonstrates Understanding/Competency (verbalizes/demonstrates), Written Materials Provided  Physical Activity (incorporating physical activity into one's lifestyle): Discussion, Needs Review, Instructed, Individual Session, Demonstrates Understanding/Competency (verbalizes/demonstrates), Written Materials Provided  Medications (states correct name, dose, onset, peak, duration, side effects & timing of meds): Discussion, Needs Review, Instructed, Individual Session, Demonstrates Understanding/Competency(verbalizes/demonstrates), Written Materials Provided  Monitoring (monitoring blood glucose/other parameters & using results): Discussion, Needs Review, Instructed, Individual Session, Demonstrates Understanding/Competency (verbalizes/demonstrates), Written  Materials Provided  Acute Complications (preventing, detecting, and treating acute complications): Discussion, Needs Review, Instructed, Individual Session, Demonstrates Understanding/Competency (verbalizes/demonstrates), Written Materials Provided  Chronic Complications (preventing, detecting, and treating chronic complications): Discussion, Needs Review, Instructed, Individual Session, Demonstrates Understanding/Competency (verbalizes/demonstrates), Written Materials Provided  Clinical (diabetes, other pertinent medical history, and relevant comorbidities reviewed during visit): Discussion, Individual Session, Demonstrates Understanding/Competency (verbalizes/demonstrates)  Cognitive (knowledge of self-management skills, functional health literacy): Discussion, Needs Review, Instructed, Individual Session, Demonstrates Understanding/Competency (verbalizes/demonstrates)  Psychosocial (emotional response to diabetes): Discussion, Individual Session, Demonstrates Understanding/Competency (verbalizes/demonstrates)  Diabetes Distress and Support Systems: Discussion, Individual Session, Demonstrates Understanding/Competency (verbalizes/demonstrates)  Behavioral (readiness for change, lifestyle practices, self-care behaviors): Discussion, Needs Review, Instructed, Individual Session, Demonstrates Understanding/Competency (verbalizes/demonstrates)    Goals  Patient has selected/evaluated goals during today's session: Yes, selected  Healthy Eating: Set(use meal plan - carb portions/spacing)  Start Date: 08/11/20  Target Date: 09/01/20  Physical Activity: Set(150min/wk - walking prog)  Start Date: 08/11/20  Target Date: 09/01/20  Monitoring: Set(test BG 4x/d -fst, ac, bed)  Start Date: 08/11/20  Target Date: 09/01/20  Medications: Set(dose diabetes medications as directed)  Start Date: 08/11/20  Target Date: 09/01/20     Diabetes Care Plan/Intervention  Education Plan/Intervention: Individual Follow-Up DSMT(CAROL hwang/ Dr. Medrano  as directed for medical mgmt.) RTC ~3-4wks, prn, as referred.     Diabetes Meal Plan  Restrictions: Low Fat, Low Sodium, Restricted Carbohydrate  Calories: 1600  Carbohydrate Per Meal: 30-45g  Carbohydrate Per Snack : 7-15g    Today's Self-Management Care Plan was developed with the patient's input and is based on barriers identified during today's assessment.    The long and short-term goals in the care plan were written with the patient/caregiver's input. The patient has agreed to work toward these goals to improve her overall diabetes control.      The patient received a copy of today's self-management plan and verbalized understanding of the care plan, goals, and all of today's instructions.      The patient was encouraged to communicate with her physician and care team regarding her condition(s) and treatment.  I provided the patient with my contact information today and encouraged her to contact me via phone or patient portal as needed.     Education Units of Time   Time Spent: 60 min

## 2020-08-11 NOTE — PATIENT INSTRUCTIONS
CHECK YOUR CBG PRIOR TO HAVING BREAKFAST   Lantus dose:  If CBG less than 95  Eat first then take only 5 units.  7 units if CBG   8 units if -150  9 units if -200  10 UNITS if -250  11 units if -300  12 units if -350  ---------------------------    Email your CBG log every few days.

## 2020-09-01 ENCOUNTER — LAB VISIT (OUTPATIENT)
Dept: LAB | Facility: HOSPITAL | Age: 25
End: 2020-09-01
Attending: INTERNAL MEDICINE
Payer: COMMERCIAL

## 2020-09-01 DIAGNOSIS — E10.9 DIABETES MELLITUS TYPE I: ICD-10-CM

## 2020-09-01 DIAGNOSIS — E10.9 DIABETES MELLITUS TYPE I: Primary | ICD-10-CM

## 2020-09-01 LAB — C PEPTIDE SERPL-MCNC: 1.61 NG/ML (ref 0.78–5.19)

## 2020-09-01 PROCEDURE — 84443 ASSAY THYROID STIM HORMONE: CPT

## 2020-09-01 PROCEDURE — 82947 ASSAY GLUCOSE BLOOD QUANT: CPT

## 2020-09-01 PROCEDURE — 36415 COLL VENOUS BLD VENIPUNCTURE: CPT

## 2020-09-01 PROCEDURE — 84681 ASSAY OF C-PEPTIDE: CPT

## 2020-09-02 LAB
GLUCOSE SERPL-MCNC: 89 MG/DL (ref 70–110)
TSH SERPL DL<=0.005 MIU/L-ACNC: 1.42 UIU/ML (ref 0.4–4)

## 2020-09-04 ENCOUNTER — OFFICE VISIT (OUTPATIENT)
Dept: ENDOCRINOLOGY | Facility: CLINIC | Age: 25
End: 2020-09-04
Payer: COMMERCIAL

## 2020-09-04 VITALS
SYSTOLIC BLOOD PRESSURE: 107 MMHG | HEART RATE: 84 BPM | BODY MASS INDEX: 23.35 KG/M2 | DIASTOLIC BLOOD PRESSURE: 75 MMHG | WEIGHT: 157.63 LBS | HEIGHT: 69 IN

## 2020-09-04 DIAGNOSIS — L83 ACANTHOSIS NIGRICANS: ICD-10-CM

## 2020-09-04 DIAGNOSIS — Z79.4 LONG-TERM INSULIN USE: Primary | ICD-10-CM

## 2020-09-04 DIAGNOSIS — E11.9 DIABETES MELLITUS, NEW ONSET: ICD-10-CM

## 2020-09-04 LAB — GLUCOSE SERPL-MCNC: 182 MG/DL (ref 70–110)

## 2020-09-04 PROCEDURE — 99999 PR PBB SHADOW E&M-EST. PATIENT-LVL III: CPT | Mod: PBBFAC,,, | Performed by: INTERNAL MEDICINE

## 2020-09-04 PROCEDURE — 1126F AMNT PAIN NOTED NONE PRSNT: CPT | Mod: S$GLB,,, | Performed by: INTERNAL MEDICINE

## 2020-09-04 PROCEDURE — 82962 POCT GLUCOSE, HAND-HELD DEVICE: ICD-10-PCS | Mod: S$GLB,,, | Performed by: INTERNAL MEDICINE

## 2020-09-04 PROCEDURE — 82962 GLUCOSE BLOOD TEST: CPT | Mod: S$GLB,,, | Performed by: INTERNAL MEDICINE

## 2020-09-04 PROCEDURE — 3008F PR BODY MASS INDEX (BMI) DOCUMENTED: ICD-10-PCS | Mod: CPTII,S$GLB,, | Performed by: INTERNAL MEDICINE

## 2020-09-04 PROCEDURE — 3046F HEMOGLOBIN A1C LEVEL >9.0%: CPT | Mod: CPTII,S$GLB,, | Performed by: INTERNAL MEDICINE

## 2020-09-04 PROCEDURE — 3046F PR MOST RECENT HEMOGLOBIN A1C LEVEL > 9.0%: ICD-10-PCS | Mod: CPTII,S$GLB,, | Performed by: INTERNAL MEDICINE

## 2020-09-04 PROCEDURE — 99214 PR OFFICE/OUTPT VISIT, EST, LEVL IV, 30-39 MIN: ICD-10-PCS | Mod: S$GLB,,, | Performed by: INTERNAL MEDICINE

## 2020-09-04 PROCEDURE — 1126F PR PAIN SEVERITY QUANTIFIED, NO PAIN PRESENT: ICD-10-PCS | Mod: S$GLB,,, | Performed by: INTERNAL MEDICINE

## 2020-09-04 PROCEDURE — 99214 OFFICE O/P EST MOD 30 MIN: CPT | Mod: S$GLB,,, | Performed by: INTERNAL MEDICINE

## 2020-09-04 PROCEDURE — 3008F BODY MASS INDEX DOCD: CPT | Mod: CPTII,S$GLB,, | Performed by: INTERNAL MEDICINE

## 2020-09-04 PROCEDURE — 99999 PR PBB SHADOW E&M-EST. PATIENT-LVL III: ICD-10-PCS | Mod: PBBFAC,,, | Performed by: INTERNAL MEDICINE

## 2020-09-23 ENCOUNTER — CLINICAL SUPPORT (OUTPATIENT)
Dept: DIABETES | Facility: CLINIC | Age: 25
End: 2020-09-23
Payer: COMMERCIAL

## 2020-09-23 VITALS — HEIGHT: 69 IN | WEIGHT: 161.19 LBS | BODY MASS INDEX: 23.87 KG/M2

## 2020-09-23 DIAGNOSIS — E11.9 DIABETES MELLITUS, NEW ONSET: Primary | ICD-10-CM

## 2020-09-23 PROCEDURE — G0108 PR DIAB MANAGE TRN  PER INDIV: ICD-10-PCS | Mod: S$GLB,,, | Performed by: DIETITIAN, REGISTERED

## 2020-09-23 PROCEDURE — 99999 PR PBB SHADOW E&M-EST. PATIENT-LVL III: ICD-10-PCS | Mod: PBBFAC,,, | Performed by: DIETITIAN, REGISTERED

## 2020-09-23 PROCEDURE — G0108 DIAB MANAGE TRN  PER INDIV: HCPCS | Mod: S$GLB,,, | Performed by: DIETITIAN, REGISTERED

## 2020-09-23 PROCEDURE — 99999 PR PBB SHADOW E&M-EST. PATIENT-LVL III: CPT | Mod: PBBFAC,,, | Performed by: DIETITIAN, REGISTERED

## 2020-09-23 NOTE — PROGRESS NOTES
Diabetes Education  Author: Josephine Marks RD, CDE  Date: 9/23/2020    Diabetes Care Management Summary  Diabetes Education Record Assessment/Progress: Comprehensive/Group(assessment 8/11/20)  Current Diabetes Risk Level: High     Last A1c:   Lab Results   Component Value Date    HGBA1C 13.0 (H) 07/23/2020     Diabetes Type  Diabetes Type : Type I    Diabetes History  Diabetes Diagnosis: 0-1 year  Current Treatment: Diet, Exercise, Insulin(lantus daily (s/s per Dr. Medrano) - pt reports dosing 4-6units daily)  Reviewed Problem List with Patient: Yes    Health Maintenance was reviewed today with patient. Discussed with patient importance of routine eye exams, foot exams/foot care, blood work (i.e.: A1c, microalbumin, and lipid), dental visits, yearly flu vaccine, and pneumonia vaccine as indicated by PCP. Patient verbalized understanding.     Health Maintenance Topics with due status: Not Due       Topic Last Completion Date    TETANUS VACCINE 08/22/2016    Pap Smear 12/12/2017    Lipid Panel 07/23/2020    Hemoglobin A1c 07/23/2020     Health Maintenance Due   Topic Date Due    Hepatitis C Screening  1996    Foot Exam  04/15/2006    Eye Exam  04/15/2006    Pneumococcal Vaccine (Medium Risk) (1 of 1 - PPSV23) 04/15/2015    Urine Microalbumin  09/21/2016    Chlamydia Screening  12/12/2018    Influenza Vaccine (1) 08/01/2020       Nutrition  Meal Planning: (Per recall, intake ~800-1200cals/d. Irregular intake carb portions (10grams/meal to 45 grams/meal). No excess carb, fat and sodium. Adequate fruit, nonstarchy veg. Using whole grains. Added protein shake to assist weight stability.)  Meal Plan 24 Hour Recall - Breakfast: instant grits (1pkt)  Meal Plan 24 Hour Recall - Lunch: bkd chix w/ green beans OR chix breast on lettuce OR or hamburger on wheat bread, bkd chips  Meal Plan 24 Hour Recall - Dinner: red beans 1c, rice 1/3 OR chix wings w/ salad  Meal Plan 24 Hour Recall - Snack: pro shake;  carrots/ranch; apples -- la: water, crystal light    Monitoring   Self Monitoring : fst , 132; 2hr pp   Blood Glucose Logs: Yes  In the last month, how often have you had a low blood sugar reaction?: never  Can you tell when your blood sugar is too high?: no    Exercise   Exercise Type: (Walks at local park 40min 4d/wk)    Social History  Preferred Learning Method: Face to Face  Primary Support: Self  Occupation: Works at [employer] in [employer]. 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  Smoking Status: Never a Smoker  Alcohol Use: Never     DDS-2 Score  ( > 3 = SIGNIFICANT DISTRESS): 1.5       Barriers to Change  Barriers to Change: None  Learning Challenges : None    Readiness to Learn   Readiness to Learn : Eager    Cultural Influences  Cultural Influences: No    Diabetes Education Assessment/Progress  Diabetes Disease Process (diabetes disease process and treatment options): Discussion, Individual Session, Demonstrates Understanding/Competency(verbalizes/demonstrates), Written Materials Provided  Nutrition (Incorporating nutritional management into one's lifestyle): Discussion, Individual Session, Demonstrates Understanding/Competency (verbalizes/demonstrates)  Physical Activity (incorporating physical activity into one's lifestyle): Discussion, Individual Session, Demonstrates Understanding/Competency (verbalizes/demonstrates)  Medications (states correct name, dose, onset, peak, duration, side effects & timing of meds): Discussion, Individual Session, Demonstrates Understanding/Competency(verbalizes/demonstrates), Written Materials Provided  Monitoring (monitoring blood glucose/other parameters & using results): Discussion, Individual Session, Demonstrates Understanding/Competency (verbalizes/demonstrates)  Acute Complications (preventing, detecting, and treating acute complications): Discussion, Individual Session, Demonstrates Understanding/Competency  (verbalizes/demonstrates)  Chronic Complications (preventing, detecting, and treating chronic complications): Discussion, Individual Session, Demonstrates Understanding/Competency (verbalizes/demonstrates)  Clinical (diabetes, other pertinent medical history, and relevant comorbidities reviewed during visit): Discussion, Individual Session, Demonstrates Understanding/Competency (verbalizes/demonstrates)  Cognitive (knowledge of self-management skills, functional health literacy): Discussion, Individual Session, Demonstrates Understanding/Competency (verbalizes/demonstrates)  Psychosocial (emotional response to diabetes): Discussion, Individual Session, Demonstrates Understanding/Competency (verbalizes/demonstrates)  Diabetes Distress and Support Systems: Discussion, Individual Session, Demonstrates Understanding/Competency (verbalizes/demonstrates)  Behavioral (readiness for change, lifestyle practices, self-care behaviors): Discussion, Individual Session, Demonstrates Understanding/Competency (verbalizes/demonstrates)    Goals  Patient has selected/evaluated goals during today's session: Yes, evaluated  Healthy Eating: Set(use meal plan - carb portions/spacing)  Met Percentage : 75%(maintain adequate oral intake; carb portions/spacing)  Start Date: 09/23/20  Target Date: 11/18/20  Physical Activity: Set(150min/wk - walking prog)  Met Percentage : 100%  Start Date: 09/23/20  Target Date: 11/18/20  Monitoring: Set(test BG 4x/d -fst, ac, bed)  Met Percentage : 50%  Start Date: 09/23/20  Target Date: 11/18/20  Medications: Set(dose diabetes medications as directed)  Met Percentage : 100%  Start Date: 09/23/20  Target Date: 11/18/20  Healthy Coping: Set  Start Date: 09/23/20(Utilize diabetes online support resources - diabetes.org ([id number]))  Target Date: 11/18/20     Diabetes Care Plan/Intervention  Education Plan/Intervention: Individual Follow-Up DSMT(CAROL hwang/ Dr. Medrano as directed for medical mgmt.) RTC  ~2mos, prn or as referred.     Diabetes Meal Plan  Restrictions: Low Fat, Low Sodium, Restricted Carbohydrate  Calories: 1600  Carbohydrate Per Meal: 30-45g  Carbohydrate Per Snack : 7-15g    Today's Self-Management Care Plan was developed with the patient's input and is based on barriers identified during today's assessment.    The long and short-term goals in the care plan were written with the patient/caregiver's input. The patient has agreed to work toward these goals to improve her overall diabetes control.      The patient received a copy of today's self-management plan and verbalized understanding of the care plan, goals, and all of today's instructions.      The patient was encouraged to communicate with her physician and care team regarding her condition(s) and treatment.  I provided the patient with my contact information today and encouraged her to contact me via phone or patient portal as needed.     Education Units of Time   Time Spent: 60 min

## 2020-11-11 ENCOUNTER — CLINICAL SUPPORT (OUTPATIENT)
Dept: DIABETES | Facility: CLINIC | Age: 25
End: 2020-11-11
Payer: COMMERCIAL

## 2020-11-11 VITALS — BODY MASS INDEX: 23.6 KG/M2 | HEIGHT: 69 IN | WEIGHT: 159.38 LBS

## 2020-11-11 DIAGNOSIS — E11.9 DIABETES MELLITUS, NEW ONSET: Primary | ICD-10-CM

## 2020-11-11 PROCEDURE — G0108 PR DIAB MANAGE TRN  PER INDIV: ICD-10-PCS | Mod: S$GLB,,, | Performed by: DIETITIAN, REGISTERED

## 2020-11-11 PROCEDURE — G0108 DIAB MANAGE TRN  PER INDIV: HCPCS | Mod: S$GLB,,, | Performed by: DIETITIAN, REGISTERED

## 2020-11-11 PROCEDURE — 99999 PR PBB SHADOW E&M-EST. PATIENT-LVL III: ICD-10-PCS | Mod: PBBFAC,,, | Performed by: DIETITIAN, REGISTERED

## 2020-11-11 PROCEDURE — 99999 PR PBB SHADOW E&M-EST. PATIENT-LVL III: CPT | Mod: PBBFAC,,, | Performed by: DIETITIAN, REGISTERED

## 2020-11-11 NOTE — LETTER
November 11, 2020      Vlad Medrano MD  [address] [facility]  [place] [address]         Patient: Christopher Lees   MR Number: [id number]   YOB: 1995   Date of Visit: 11/11/2020       Dear Dr. Medrano:    Thank you for referring Christopher for diabetes self-management education and support. She has completed all components of our Diabetes Management Program and her Self-Management Support Plan. Below is a summary of her clinical outcomes and goal progress.    Patient Outcomes:    A1c Status:  Pending repeat A1C - pt has fu w/ Dr. Medrano this week.   Lab Results   Component Value Date    HGBA1C 13.0 (H) 07/23/2020     Goals  Patient has selected/evaluated goals during today's session: Yes, evaluated  Healthy Eating: Set(maintain adequate oral intake; carb portions/spacing)  Met Percentage : 75%  Start Date: 11/11/20  Target Date: 03/08/21  Physical Activity: % Met(150min/wk - walking prog)  Met Percentage : 100%  Start Date: 11/11/20  Target Date: 03/08/21  Monitoring: % Met(test BG 4x/d -fst, ac, bed)  Met Percentage : 25%  Start Date: 11/11/20  Target Date: 03/08/21  Medications: % Met(dose diabetes medications as directed)  Met Percentage : 100%  Start Date: 11/11/20  Target Date: 02/08/21  Healthy Coping: % Met(Utilize diabetes online support resources - diabetes.org ([id number].pdf))  Met Percentage : 100%    Diabetes Self-Management Support Plan  Exercise/Nutrition: use a local track  Review Status: Patient has selected and agrees to support plan.    Follow up:   · Christopher to follow diabetes support plan indicated above  · Christopher to attend medical appointments as scheduled  · Christopher to update you on her DM education progress as needed      If you have questions, please do not hesitate to call me. I look forward to providing additional education and support ~4mos, as needed or as referred.    Sincerely,    Josephine Marks, [place], CDE

## 2020-11-11 NOTE — PROGRESS NOTES
Diabetes Education  Author: Josephine Marks RD, CDE  Date: 11/11/2020    Diabetes Care Management Summary  Diabetes Education Record Assessment/Progress: Post Program/Follow-up(assessment 8/11/20)  Current Diabetes Risk Level: High     Last A1c:  Pending repeat A1C - pt has fu w/ Dr. Medrano this week.   Lab Results   Component Value Date    HGBA1C 13.0 (H) 07/23/2020     Diabetes Type  Diabetes Type : Type I    Diabetes History  Diabetes Diagnosis: 0-1 year  Current Treatment: Diet, Exercise, Insulin(lantus daily (s/s per Dr. Medrano) - pt reports dosing 4-9 units daily)  Reviewed Problem List with Patient: Yes    Health Maintenance was reviewed today with patient. Discussed with patient importance of routine eye exams, foot exams/foot care, blood work (i.e.: A1c, microalbumin, and lipid), dental visits, yearly flu vaccine, and pneumonia vaccine as indicated by PCP. Patient verbalized understanding.     Health Maintenance Topics with due status: Not Due       Topic Last Completion Date    TETANUS VACCINE 08/22/2016    Lipid Panel 07/23/2020    Hemoglobin A1c 07/23/2020     Health Maintenance Due   Topic Date Due    Hepatitis C Screening  1995    Foot Exam  04/15/2006    Eye Exam  04/15/2006    Pneumococcal Vaccine (Medium Risk) (1 of 1 - PPSV23) 04/15/2015    Urine Microalbumin  09/21/2016    Chlamydia Screening  12/12/2018    Influenza Vaccine (1) 08/01/2020    Pap Smear  12/12/2020     Per recall, intake ~1200cals/d. Carb intake ~10-45grams/meal, 5-15grams/snack. No excess carb, fat or sodium. Adequate fruit, nonstarchy veg, whole grains.   Nutrition  Meal Plan 24 Hour Recall - Breakfast: instant grits (1pkt), chix sausage   Meal Plan 24 Hour Recall - Lunch: bkd chix, green beans OR 2slc honey wheat, hamburger, bkd chips  Meal Plan 24 Hour Recall - Dinner: air fried chix, salad - occs brown rice, beans   Meal Plan 24 Hour Recall - Snack: pro shake; carrots/ranch; apples -- la: water, crystal  light    Monitoring   Self Monitoring : fst BG 30 (tx w/ coke, meal), ; rare pm testing   Blood Glucose Logs: Yes  Do you use a personal continuous glucose monitor?: No  In the last month, how often have you had a low blood sugar reaction?: once  What are your symptoms of low blood sugar?: jittery, weakness  Can you tell when your blood sugar is too high?: no    Exercise   Exercise Type: (walking daily - 15 min bfst, 30min lunch, 30min dinner); pm walks are at [employer].    Social History  Preferred Learning Method: Face to Face  Primary Support: Self  Occupation: Works at [employer] in [employer]. 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  Smoking Status: Never a Smoker  Alcohol Use: Never     Barriers to Change  Barriers to Change: None  Learning Challenges : None    Readiness to Learn   Readiness to Learn : Eager    Cultural Influences  Cultural Influences: No    Diabetes Education Assessment/Progress  Diabetes Disease Process (diabetes disease process and treatment options): Demonstrates Understanding/Competency(verbalizes/demonstrates)  Nutrition (Incorporating nutritional management into one's lifestyle): Discussion, Individual Session, Demonstrates Understanding/Competency (verbalizes/demonstrates)  Physical Activity (incorporating physical activity into one's lifestyle): Discussion, Individual Session, Demonstrates Understanding/Competency (verbalizes/demonstrates)  Medications (states correct name, dose, onset, peak, duration, side effects & timing of meds): Discussion, Individual Session, Demonstrates Understanding/Competency(verbalizes/demonstrates)  Monitoring (monitoring blood glucose/other parameters & using results): Discussion, Individual Session, Demonstrates Understanding/Competency (verbalizes/demonstrates)  Acute Complications (preventing, detecting, and treating acute complications): Discussion, Individual Session, Demonstrates Understanding/Competency  (verbalizes/demonstrates)  Chronic Complications (preventing, detecting, and treating chronic complications): Demonstrates Understanding/Competency (verbalizes/demonstrates)  Clinical (diabetes, other pertinent medical history, and relevant comorbidities reviewed during visit): Discussion, Individual Session, Demonstrates Understanding/Competency (verbalizes/demonstrates)  Cognitive (knowledge of self-management skills, functional health literacy): Discussion, Individual Session, Demonstrates Understanding/Competency (verbalizes/demonstrates)  Psychosocial (emotional response to diabetes): Demonstrates Understanding/Competency (verbalizes/demonstrates)  Diabetes Distress and Support Systems: Demonstrates Understanding/Competency (verbalizes/demonstrates)  Behavioral (readiness for change, lifestyle practices, self-care behaviors): Discussion, Individual Session, Demonstrates Understanding/Competency (verbalizes/demonstrates)    Goals  Patient has selected/evaluated goals during today's session: Yes, evaluated  Healthy Eating: Set(maintain adequate oral intake; carb portions/spacing)  Met Percentage : 75%  Start Date: 11/11/20  Target Date: 03/08/21  Physical Activity: % Met(150min/wk - walking prog)  Met Percentage : 100%  Start Date: 11/11/20  Target Date: 03/08/21  Monitoring: % Met(test BG 4x/d -fst, ac, bed)  Met Percentage : 25%  Start Date: 11/11/20  Target Date: 03/08/21  Medications: % Met(dose diabetes medications as directed)  Met Percentage : 100%  Start Date: 11/11/20  Target Date: 02/08/21  Healthy Coping: % Met(Utilize diabetes online support resources - diabetes.org ([id number].pdf))  Met Percentage : 100%    Diabetes Self-Management Support Plan  Exercise/Nutrition: use a local track  Review Status: Patient has selected and agrees to support plan.    Diabetes Care Plan/Intervention  Education Plan/Intervention: Individual Follow-Up DSMT(CAROL hwang/ Dr. Medrano, primary care as directed for medical  mgmt.) RTC ~4mos, prn or as referred.    Diabetes Meal Plan  Restrictions: Low Fat, Low Sodium, Restricted Carbohydrate  Calories: 1600  Carbohydrate Per Meal: 30-45g  Carbohydrate Per Snack : 7-15g    Today's Self-Management Care Plan was developed with the patient's input and is based on barriers identified during today's assessment.    The long and short-term goals in the care plan were written with the patient/caregiver's input. The patient has agreed to work toward these goals to improve her overall diabetes control.      The patient received a copy of today's self-management plan and verbalized understanding of the care plan, goals, and all of today's instructions.      The patient was encouraged to communicate with her physician and care team regarding her condition(s) and treatment.  I provided the patient with my contact information today and encouraged her to contact me via phone or patient portal as needed.     Education Units of Time   Time Spent: 60 min

## 2020-11-13 ENCOUNTER — OFFICE VISIT (OUTPATIENT)
Dept: ENDOCRINOLOGY | Facility: CLINIC | Age: 25
End: 2020-11-13
Payer: COMMERCIAL

## 2020-11-13 ENCOUNTER — LAB VISIT (OUTPATIENT)
Dept: LAB | Facility: HOSPITAL | Age: 25
End: 2020-11-13
Attending: INTERNAL MEDICINE
Payer: COMMERCIAL

## 2020-11-13 VITALS
BODY MASS INDEX: 24.26 KG/M2 | SYSTOLIC BLOOD PRESSURE: 113 MMHG | HEART RATE: 67 BPM | RESPIRATION RATE: 16 BRPM | HEIGHT: 69 IN | DIASTOLIC BLOOD PRESSURE: 71 MMHG | WEIGHT: 163.81 LBS

## 2020-11-13 DIAGNOSIS — Z79.4 LONG-TERM INSULIN USE: ICD-10-CM

## 2020-11-13 DIAGNOSIS — E10.649 HYPOGLYCEMIA DUE TO TYPE 1 DIABETES MELLITUS: ICD-10-CM

## 2020-11-13 DIAGNOSIS — E10.9 TYPE 1 DIABETES MELLITUS WITHOUT COMPLICATION: Primary | ICD-10-CM

## 2020-11-13 DIAGNOSIS — E10.9 TYPE 1 DIABETES MELLITUS WITHOUT COMPLICATION: ICD-10-CM

## 2020-11-13 LAB
ALBUMIN SERPL BCP-MCNC: 3.8 G/DL (ref 3.5–5.2)
ALP SERPL-CCNC: 73 U/L (ref 55–135)
ALT SERPL W/O P-5'-P-CCNC: 11 U/L (ref 10–44)
ANION GAP SERPL CALC-SCNC: 7 MMOL/L (ref 8–16)
AST SERPL-CCNC: 13 U/L (ref 10–40)
BILIRUB SERPL-MCNC: 0.4 MG/DL (ref 0.1–1)
BUN SERPL-MCNC: 15 MG/DL (ref 6–20)
CALCIUM SERPL-MCNC: 9.2 MG/DL (ref 8.7–10.5)
CHLORIDE SERPL-SCNC: 106 MMOL/L (ref 95–110)
CHOLEST SERPL-MCNC: 200 MG/DL (ref 120–199)
CHOLEST/HDLC SERPL: 3.6 {RATIO} (ref 2–5)
CO2 SERPL-SCNC: 24 MMOL/L (ref 23–29)
CREAT SERPL-MCNC: 0.9 MG/DL (ref 0.5–1.4)
EST. GFR  (AFRICAN AMERICAN): >60 ML/MIN/1.73 M^2
EST. GFR  (NON AFRICAN AMERICAN): >60 ML/MIN/1.73 M^2
ESTIMATED AVG GLUCOSE: 212 MG/DL (ref 68–131)
GLUCOSE SERPL-MCNC: 194 MG/DL (ref 70–110)
HBA1C MFR BLD HPLC: 9 % (ref 4–5.6)
HDLC SERPL-MCNC: 55 MG/DL (ref 40–75)
HDLC SERPL: 27.5 % (ref 20–50)
LDLC SERPL CALC-MCNC: 133.2 MG/DL (ref 63–159)
NONHDLC SERPL-MCNC: 145 MG/DL
POTASSIUM SERPL-SCNC: 4.3 MMOL/L (ref 3.5–5.1)
PROT SERPL-MCNC: 6.7 G/DL (ref 6–8.4)
SODIUM SERPL-SCNC: 137 MMOL/L (ref 136–145)
TRIGL SERPL-MCNC: 59 MG/DL (ref 30–150)

## 2020-11-13 PROCEDURE — 1126F AMNT PAIN NOTED NONE PRSNT: CPT | Mod: S$GLB,,, | Performed by: INTERNAL MEDICINE

## 2020-11-13 PROCEDURE — 3046F PR MOST RECENT HEMOGLOBIN A1C LEVEL > 9.0%: ICD-10-PCS | Mod: CPTII,S$GLB,, | Performed by: INTERNAL MEDICINE

## 2020-11-13 PROCEDURE — 80061 LIPID PANEL: CPT

## 2020-11-13 PROCEDURE — 99999 PR PBB SHADOW E&M-EST. PATIENT-LVL IV: CPT | Mod: PBBFAC,,, | Performed by: INTERNAL MEDICINE

## 2020-11-13 PROCEDURE — 3046F HEMOGLOBIN A1C LEVEL >9.0%: CPT | Mod: CPTII,S$GLB,, | Performed by: INTERNAL MEDICINE

## 2020-11-13 PROCEDURE — 3008F PR BODY MASS INDEX (BMI) DOCUMENTED: ICD-10-PCS | Mod: CPTII,S$GLB,, | Performed by: INTERNAL MEDICINE

## 2020-11-13 PROCEDURE — 3008F BODY MASS INDEX DOCD: CPT | Mod: CPTII,S$GLB,, | Performed by: INTERNAL MEDICINE

## 2020-11-13 PROCEDURE — 99999 PR PBB SHADOW E&M-EST. PATIENT-LVL IV: ICD-10-PCS | Mod: PBBFAC,,, | Performed by: INTERNAL MEDICINE

## 2020-11-13 PROCEDURE — 80053 COMPREHEN METABOLIC PANEL: CPT

## 2020-11-13 PROCEDURE — 99214 OFFICE O/P EST MOD 30 MIN: CPT | Mod: S$GLB,,, | Performed by: INTERNAL MEDICINE

## 2020-11-13 PROCEDURE — 99214 PR OFFICE/OUTPT VISIT, EST, LEVL IV, 30-39 MIN: ICD-10-PCS | Mod: S$GLB,,, | Performed by: INTERNAL MEDICINE

## 2020-11-13 PROCEDURE — 36415 COLL VENOUS BLD VENIPUNCTURE: CPT

## 2020-11-13 PROCEDURE — 83036 HEMOGLOBIN GLYCOSYLATED A1C: CPT

## 2020-11-13 PROCEDURE — 1126F PR PAIN SEVERITY QUANTIFIED, NO PAIN PRESENT: ICD-10-PCS | Mod: S$GLB,,, | Performed by: INTERNAL MEDICINE

## 2020-11-13 NOTE — LETTER
November 13, 2020      [facility]  [address] [facility] [address]  [place] [address]  Phone: [phone]  Fax: [phone]       Patient: Christopher Lees   YOB: 1995  Date of Visit: 11/13/2020    To Whom It May Concern:    Luke Lees  was at [facility] on 11/13/2020. She may return to work on 11/13/2020 with no restrictions. If you have any questions or concerns, or if I can be of further assistance, please do not hesitate to contact me.    Sincerely,    Mary York MA

## 2020-11-13 NOTE — PROGRESS NOTES
Diabetes follow up visit:    CBG readings have been:    Any low blood sugar/ hypoglycemia occurred since last visit? No   Any changes on the diabetic medication since last visit? No     Is there a sensor? No   Downloaded? No     Any significant changes in health occurred since last visit?  ( Hospitalization, surgery, etc) no     If a refill is requested for medication(s), pend the order(s)      Referring physician:  David Vergara MD    Reason for referral:  Diabetes, new onset  Patient needs diabetes education       Patient ID: Christopher Lees is a 25 y.o. female.    Chief Complaint:  Follow-up on diabetes     HPI     Lab Results   Component Value Date    HGBA1C 13.0 (H) 07/23/2020     Patient is feeling fine today.  She has been checking her blood sugar mostly once a day in the morning and sometimes  She checks another time in the afternoon.  SHE DOES EXERCISE IN THE MORNING AFTER BREAKFAST AND  IN THE AFTERNOON AFTER LUNCH.   Her CBGs have been mostly   36 Nov 1st,  after exercise in the evening  On Lantus one dose daily per sliding scale.  No fast acting insulin prescribed.    Patient was diagnosed with diabetes few months ago.  She could not tolerate taking metformin.    Blood test showed + antibodies and C-peptide levels of 1.6 and 1.5    --------------------------------------------------------------------------------    The following is a copy from my last note:  Patient was feeling sick and tired, and having near-syncope for 2 days so she visited an Urgent Clinic last Tuesday,  Seven days ago.  She was found to have glucose by urine dipstick, and CBG was 364 per patient.  She was treated in emergency room and she was discharged on metformin 500 mg twice a day.  She has been having nausea.  Yesterday and today she felt jittery.  Dr. Vergara prescribed 20 units of Lantus for patient to take daily last Friday.  Patient said her blood sugar was 234 at the  office prior to starting on  insulin.  Blood sugar was 85 yesterday, and CBG this visit is 97.  Patient took Lantus  20 units yesterday morning and she took 10 units this morning,  After the dose was reduced per the recommendation of Dr. Vergara.  On Saturday CBG readings were 149 and 250 and on Sunday 101 and 104  No complaints of abdominal pain, dysphagia, n/v, cp, sob, abd pain, rash, or edema.     Parents have no DM  Mom has htn, and high chol  Father's sister is obese and has t2 DM  GM's sister had DM        Past Medical History:   Diagnosis Date    Hyperglycemia        Past Surgical History:   Procedure Laterality Date    REDUCTION OF BOTH BREASTS         Social History     Socioeconomic History    Marital status: Single     Spouse name: Not on file    Number of children: Not on file    Years of education: Not on file    Highest education level: Not on file   Occupational History    Not on file   Social Needs    Financial resource strain: Not on file    Food insecurity     Worry: Not on file     Inability: Not on file    Transportation needs     Medical: Not on file     Non-medical: Not on file   Tobacco Use    Smoking status: Never Smoker    Smokeless tobacco: Never Used   Substance and Sexual Activity    Alcohol use: No    Drug use: No    Sexual activity: Yes     Partners: Male     Birth control/protection: Injection   Lifestyle    Physical activity     Days per week: Not on file     Minutes per session: Not on file    Stress: Not on file   Relationships    Social connections     Talks on phone: Not on file     Gets together: Not on file     Attends Gnosticism service: Not on file     Active member of club or organization: Not on file     Attends meetings of clubs or organizations: Not on file     Relationship status: Not on file   Other Topics Concern    Not on file   Social History Narrative    Works at [employer]; 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         Breakfast: Skips.    Lunch:  Chicken breast, salads and sometimes fries; Powerade or Gatorade    Dinner: [employer] chicken, fries; Fruit punch    Snacks: No.    Eats out: 2x/wk       ROS:   + Diabetes   ROS otherwise neg except for what is mentioned in the PMH, PSH and HPI    PE:  Vitals:    11/13/20 0920   BP: 113/71   Pulse: 67   Resp: 16    Patient does not look sick  Alert and oriented  No acute distress  No Obesity  Body mass index is 24.19 kg/m².  No Proptosis or conjunctivitis  No goitre by inspection  Thyroid gland is not palpable  Heart reg, no gallop  Lungs cta, no wheezing  No edema in lower legs  Speech normal  Behavior normal  No tremor    Lab Results   Component Value Date    CHOL 263 (H) 07/23/2020    TRIG 53 07/23/2020    HDL 44 07/23/2020    CHOLHDL 26.0 12/30/2016    TOTALCHOLEST 3.8 12/30/2016    NONHDLCHOL 148 12/30/2016     BMP  Lab Results   Component Value Date     07/23/2020    K 4.9 07/23/2020     07/23/2020    CO2 21 07/23/2020    BUN 14 07/23/2020    CREATININE 0.88 07/23/2020    CALCIUM 10.4 (H) 07/23/2020    ANIONGAP 8 12/30/2016    ESTGFRAFRICA >60.0 12/30/2016    EGFRNONAA 92 07/23/2020        Ref. Range 7/28/2020 11:05 8/11/2020 15:03 9/1/2020 12:53   C-Peptide Latest Ref Range: 0.78 - 5.19 ng/mL   1.61   Glutamic Acid Decarb Ab Latest Ref Range: <=0.02 nmol/L 2.32 (H)     Human Insulin Ab Latest Ref Range: 0.00 - 0.02 nmol/L 0.00     TSH Latest Ref Range: 0.400 - 4.000 uIU/mL   1.416   Results for STACEY ALTMAN (MRN [id number]) as of 11/13/2020 09:40   Ref. Range 7/23/2020 11:03 7/23/2020 11:57 7/28/2020 09:59 7/28/2020 11:05 8/11/2020 15:03 9/1/2020 12:53 9/4/2020 12:02   C-Peptide Latest Ref Range: 0.78 - 5.19 ng/mL 1.5     1.61    Glutamic Acid Decarb Ab Latest Ref Range: <=0.02 nmol/L    2.32 (H)      Human Insulin Ab Latest Ref Range: 0.00 - 0.02 nmol/L    0.00      Comment Unknown Comment         TSH Latest Ref Range: 0.400 - 4.000 uIU/mL 0.834     1.416        A/P  Diabetes mellitus,  new onset, diagnosed few weeks ago  Hypoglycemia with CBG of 36 occurred on November 1st in the morning  Acanthosis nigricans  Type 1 diabetes with no complications  Type 1.5 diabetes and MARISOL diabetes cannot be ruled out at this time  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  Lantus dose will be  reduced 1 unit on sliding scale   Patient will need to check her blood sugar more than once a day and to check at bedtime  She will need to eat a snack if blood sugar at bedtime is less than 120  Mild hyperglycemia postprandially, so a very small dose of fast acting insulin  Is considered but I think it will not be needed for now or in the next few months  because patient does exercise after having breakfast and after having lunch  And also her A1c was so much high few months ago so not very tight  blood sugar control will be needed for now.    Pt has follow-up visit with the primary care physician Dr. Jai YUSUF she will discuss with him recommendations of follow-up with Endocrine.  Blood test is ordered.  Orders Placed This Encounter   Procedures    Comprehensive Metabolic Panel     Standing Status:   Future     Number of Occurrences:   1     Standing Expiration Date:   1/13/2022    Hemoglobin A1C     Standing Status:   Future     Number of Occurrences:   1     Standing Expiration Date:   5/13/2021    Lipid Panel     Standing Status:   Future     Number of Occurrences:   1     Standing Expiration Date:   5/13/2021    POCT Glucose, Hand-Held Device         Patient understands and agrees on the plan.

## 2020-11-13 NOTE — PATIENT INSTRUCTIONS
CHECK YOUR CBG PRIOR TO HAVING BREAKFAST   Lantus dose:  If CBG less than 95  Eat first then take only 6 units.  5 units if CBG   7 units if -150  8 units if -200  9 UNITS if -250  10 units if -300  11 units if -350  ---------------------------    Email your CBG log every few days.    Eat a snack if your bedtime blood sugar is less than 120.

## 2020-12-08 RX ORDER — PEN NEEDLE, DIABETIC 32GX 5/32"
NEEDLE, DISPOSABLE MISCELLANEOUS
OUTPATIENT
Start: 2020-12-08

## 2021-01-28 ENCOUNTER — OFFICE VISIT (OUTPATIENT)
Dept: ENDOCRINOLOGY | Facility: CLINIC | Age: 26
End: 2021-01-28
Payer: COMMERCIAL

## 2021-01-28 VITALS
WEIGHT: 165.38 LBS | HEART RATE: 75 BPM | SYSTOLIC BLOOD PRESSURE: 127 MMHG | TEMPERATURE: 97 F | DIASTOLIC BLOOD PRESSURE: 76 MMHG | BODY MASS INDEX: 24.5 KG/M2 | HEIGHT: 69 IN

## 2021-01-28 DIAGNOSIS — R73.09 ELEVATED HEMOGLOBIN A1C: ICD-10-CM

## 2021-01-28 DIAGNOSIS — E10.9 TYPE 1 DIABETES MELLITUS WITHOUT COMPLICATION: Primary | ICD-10-CM

## 2021-01-28 DIAGNOSIS — E11.9 DIABETES MELLITUS, NEW ONSET: ICD-10-CM

## 2021-01-28 DIAGNOSIS — E11.9 TYPE 2 DIABETES MELLITUS WITHOUT COMPLICATION, UNSPECIFIED WHETHER LONG TERM INSULIN USE: ICD-10-CM

## 2021-01-28 DIAGNOSIS — E13.9 DIABETES MELLITUS TYPE 1.5: ICD-10-CM

## 2021-01-28 LAB — GLUCOSE SERPL-MCNC: 97 MG/DL (ref 70–110)

## 2021-01-28 PROCEDURE — 3052F PR MOST RECENT HEMOGLOBIN A1C LEVEL 8.0 - < 9.0%: ICD-10-PCS | Mod: CPTII,S$GLB,, | Performed by: INTERNAL MEDICINE

## 2021-01-28 PROCEDURE — 82962 GLUCOSE BLOOD TEST: CPT | Mod: S$GLB,,, | Performed by: INTERNAL MEDICINE

## 2021-01-28 PROCEDURE — 1126F AMNT PAIN NOTED NONE PRSNT: CPT | Mod: S$GLB,,, | Performed by: INTERNAL MEDICINE

## 2021-01-28 PROCEDURE — 82962 POCT GLUCOSE, HAND-HELD DEVICE: ICD-10-PCS | Mod: S$GLB,,, | Performed by: INTERNAL MEDICINE

## 2021-01-28 PROCEDURE — 99214 OFFICE O/P EST MOD 30 MIN: CPT | Mod: S$GLB,,, | Performed by: INTERNAL MEDICINE

## 2021-01-28 PROCEDURE — 99999 PR PBB SHADOW E&M-EST. PATIENT-LVL III: ICD-10-PCS | Mod: PBBFAC,,, | Performed by: INTERNAL MEDICINE

## 2021-01-28 PROCEDURE — 1126F PR PAIN SEVERITY QUANTIFIED, NO PAIN PRESENT: ICD-10-PCS | Mod: S$GLB,,, | Performed by: INTERNAL MEDICINE

## 2021-01-28 PROCEDURE — 99999 PR PBB SHADOW E&M-EST. PATIENT-LVL III: CPT | Mod: PBBFAC,,, | Performed by: INTERNAL MEDICINE

## 2021-01-28 PROCEDURE — 3052F HG A1C>EQUAL 8.0%<EQUAL 9.0%: CPT | Mod: CPTII,S$GLB,, | Performed by: INTERNAL MEDICINE

## 2021-01-28 PROCEDURE — 99214 PR OFFICE/OUTPT VISIT, EST, LEVL IV, 30-39 MIN: ICD-10-PCS | Mod: S$GLB,,, | Performed by: INTERNAL MEDICINE

## 2021-01-28 PROCEDURE — 3008F PR BODY MASS INDEX (BMI) DOCUMENTED: ICD-10-PCS | Mod: CPTII,S$GLB,, | Performed by: INTERNAL MEDICINE

## 2021-01-28 PROCEDURE — 3008F BODY MASS INDEX DOCD: CPT | Mod: CPTII,S$GLB,, | Performed by: INTERNAL MEDICINE

## 2021-01-28 RX ORDER — PEN NEEDLE, DIABETIC 30 GX3/16"
NEEDLE, DISPOSABLE MISCELLANEOUS
Qty: 300 EACH | Refills: 2 | Status: SHIPPED | OUTPATIENT
Start: 2021-01-28 | End: 2022-08-26

## 2021-01-28 RX ORDER — INSULIN GLARGINE 100 [IU]/ML
INJECTION, SOLUTION SUBCUTANEOUS
Qty: 1 BOX | Refills: 2 | Status: SHIPPED | OUTPATIENT
Start: 2021-01-28 | End: 2021-11-24

## 2021-11-24 PROBLEM — E10.9 DIABETES MELLITUS TYPE I: Status: ACTIVE | Noted: 2021-11-24

## 2023-05-23 LAB — HBA1C MFR BLD: 8.9 % (ref 4–6)

## 2024-01-17 ENCOUNTER — PATIENT OUTREACH (OUTPATIENT)
Dept: ADMINISTRATIVE | Facility: HOSPITAL | Age: 29
End: 2024-01-17